# Patient Record
Sex: MALE | Race: WHITE | NOT HISPANIC OR LATINO | URBAN - METROPOLITAN AREA
[De-identification: names, ages, dates, MRNs, and addresses within clinical notes are randomized per-mention and may not be internally consistent; named-entity substitution may affect disease eponyms.]

---

## 2017-02-06 ENCOUNTER — INPATIENT (INPATIENT)
Facility: HOSPITAL | Age: 82
LOS: 3 days | Discharge: HOME | End: 2017-02-10
Attending: HOSPITALIST | Admitting: INTERNAL MEDICINE
Payer: MEDICARE

## 2017-02-06 VITALS — TEMPERATURE: 98 F | DIASTOLIC BLOOD PRESSURE: 87 MMHG | HEART RATE: 66 BPM | SYSTOLIC BLOOD PRESSURE: 135 MMHG

## 2017-02-06 LAB
ALBUMIN SERPL ELPH-MCNC: 3.4 G/DL — SIGNIFICANT CHANGE UP (ref 3.3–5)
ALP SERPL-CCNC: 85 U/L — SIGNIFICANT CHANGE UP (ref 40–120)
ALT FLD-CCNC: 18 U/L — SIGNIFICANT CHANGE UP (ref 12–78)
ANION GAP SERPL CALC-SCNC: 12 MMOL/L — SIGNIFICANT CHANGE UP (ref 5–17)
ANISOCYTOSIS BLD QL: SIGNIFICANT CHANGE UP
APPEARANCE UR: CLEAR — SIGNIFICANT CHANGE UP
APTT BLD: 35.6 SEC — SIGNIFICANT CHANGE UP (ref 27.5–37.4)
AST SERPL-CCNC: 16 U/L — SIGNIFICANT CHANGE UP (ref 15–37)
BASOPHILS # BLD AUTO: 0 K/UL — SIGNIFICANT CHANGE UP (ref 0–0.2)
BILIRUB SERPL-MCNC: 0.7 MG/DL — SIGNIFICANT CHANGE UP (ref 0.2–1.2)
BILIRUB UR-MCNC: NEGATIVE — SIGNIFICANT CHANGE UP
BUN SERPL-MCNC: 11 MG/DL — SIGNIFICANT CHANGE UP (ref 7–23)
CALCIUM SERPL-MCNC: 8.6 MG/DL — SIGNIFICANT CHANGE UP (ref 8.5–10.1)
CHLORIDE SERPL-SCNC: 108 MMOL/L — SIGNIFICANT CHANGE UP (ref 96–108)
CO2 SERPL-SCNC: 24 MMOL/L — SIGNIFICANT CHANGE UP (ref 22–31)
COLOR SPEC: YELLOW — SIGNIFICANT CHANGE UP
CREAT SERPL-MCNC: 0.9 MG/DL — SIGNIFICANT CHANGE UP (ref 0.5–1.3)
DACRYOCYTES BLD QL SMEAR: SLIGHT — SIGNIFICANT CHANGE UP
DIFF PNL FLD: (no result)
EOSINOPHIL # BLD AUTO: 0.1 K/UL — SIGNIFICANT CHANGE UP (ref 0–0.5)
EOSINOPHIL NFR BLD AUTO: 1 % — SIGNIFICANT CHANGE UP (ref 0–6)
GLUCOSE SERPL-MCNC: 99 MG/DL — SIGNIFICANT CHANGE UP (ref 70–99)
GLUCOSE UR QL: NEGATIVE MG/DL — SIGNIFICANT CHANGE UP
HCT VFR BLD CALC: 35.6 % — LOW (ref 39–50)
HGB BLD-MCNC: 11.4 G/DL — LOW (ref 13–17)
HYPOCHROMIA BLD QL: SLIGHT — SIGNIFICANT CHANGE UP
KETONES UR-MCNC: NEGATIVE — SIGNIFICANT CHANGE UP
LACTATE SERPL-SCNC: 1.3 MMOL/L — SIGNIFICANT CHANGE UP (ref 0.7–2)
LEUKOCYTE ESTERASE UR-ACNC: (no result)
LYMPHOCYTES # BLD AUTO: 1.8 K/UL — SIGNIFICANT CHANGE UP (ref 1–3.3)
LYMPHOCYTES # BLD AUTO: 9 % — LOW (ref 13–44)
MACROCYTES BLD QL: SIGNIFICANT CHANGE UP
MANUAL DIF COMMENT BLD-IMP: SIGNIFICANT CHANGE UP
MCHC RBC-ENTMCNC: 32.1 GM/DL — SIGNIFICANT CHANGE UP (ref 32–36)
MCHC RBC-ENTMCNC: 34.3 PG — HIGH (ref 27–34)
MCV RBC AUTO: 106.7 FL — HIGH (ref 80–100)
MONOCYTES # BLD AUTO: 0.8 K/UL — SIGNIFICANT CHANGE UP (ref 0–0.9)
MONOCYTES NFR BLD AUTO: 10 % — SIGNIFICANT CHANGE UP (ref 2–14)
NEUTROPHILS # BLD AUTO: 3.8 K/UL — SIGNIFICANT CHANGE UP (ref 1.8–7.4)
NEUTROPHILS NFR BLD AUTO: 65 % — SIGNIFICANT CHANGE UP (ref 43–77)
NITRITE UR-MCNC: NEGATIVE — SIGNIFICANT CHANGE UP
OVALOCYTES BLD QL SMEAR: SLIGHT — SIGNIFICANT CHANGE UP
PH UR: 6 — SIGNIFICANT CHANGE UP (ref 4.8–8)
PLAT MORPH BLD: NORMAL — SIGNIFICANT CHANGE UP
PLATELET # BLD AUTO: 224 K/UL — SIGNIFICANT CHANGE UP (ref 150–400)
POLYCHROMASIA BLD QL SMEAR: SLIGHT — SIGNIFICANT CHANGE UP
POTASSIUM SERPL-MCNC: 3.9 MMOL/L — SIGNIFICANT CHANGE UP (ref 3.5–5.3)
POTASSIUM SERPL-SCNC: 3.9 MMOL/L — SIGNIFICANT CHANGE UP (ref 3.5–5.3)
PROT SERPL-MCNC: 6.7 GM/DL — SIGNIFICANT CHANGE UP (ref 6–8.3)
PROT UR-MCNC: 15 MG/DL
RBC # BLD: 3.33 M/UL — LOW (ref 4.2–5.8)
RBC # FLD: 15.8 % — HIGH (ref 10.3–14.5)
RBC BLD AUTO: (no result)
RBC CASTS # UR COMP ASSIST: SIGNIFICANT CHANGE UP /HPF (ref 0–4)
SODIUM SERPL-SCNC: 144 MMOL/L — SIGNIFICANT CHANGE UP (ref 135–145)
SP GR SPEC: 1.01 — SIGNIFICANT CHANGE UP (ref 1.01–1.02)
UROBILINOGEN FLD QL: NEGATIVE MG/DL — SIGNIFICANT CHANGE UP
VARIANT LYMPHS # BLD: 15 % — HIGH (ref 0–6)
WBC # BLD: 6.5 K/UL — SIGNIFICANT CHANGE UP (ref 3.8–10.5)
WBC # FLD AUTO: 6.5 K/UL — SIGNIFICANT CHANGE UP (ref 3.8–10.5)
WBC UR QL: SIGNIFICANT CHANGE UP

## 2017-02-06 PROCEDURE — 99285 EMERGENCY DEPT VISIT HI MDM: CPT

## 2017-02-06 PROCEDURE — 93010 ELECTROCARDIOGRAM REPORT: CPT

## 2017-02-06 PROCEDURE — 71010: CPT | Mod: 26

## 2017-02-06 RX ORDER — CEFAZOLIN SODIUM 1 G
2000 VIAL (EA) INJECTION ONCE
Qty: 0 | Refills: 0 | Status: COMPLETED | OUTPATIENT
Start: 2017-02-06 | End: 2017-02-06

## 2017-02-06 RX ORDER — DILTIAZEM HCL 120 MG
120 CAPSULE, EXT RELEASE 24 HR ORAL DAILY
Qty: 0 | Refills: 0 | Status: DISCONTINUED | OUTPATIENT
Start: 2017-02-06 | End: 2017-02-10

## 2017-02-06 RX ORDER — CEFTRIAXONE 500 MG/1
1 INJECTION, POWDER, FOR SOLUTION INTRAMUSCULAR; INTRAVENOUS EVERY 24 HOURS
Qty: 0 | Refills: 0 | Status: DISCONTINUED | OUTPATIENT
Start: 2017-02-07 | End: 2017-02-07

## 2017-02-06 RX ORDER — DRONEDARONE 400 MG/1
1 TABLET, FILM COATED ORAL
Qty: 0 | Refills: 0 | COMMUNITY

## 2017-02-06 RX ORDER — TAMSULOSIN HYDROCHLORIDE 0.4 MG/1
0.4 CAPSULE ORAL DAILY
Qty: 0 | Refills: 0 | Status: DISCONTINUED | OUTPATIENT
Start: 2017-02-06 | End: 2017-02-10

## 2017-02-06 RX ORDER — SODIUM CHLORIDE 9 MG/ML
3 INJECTION INTRAMUSCULAR; INTRAVENOUS; SUBCUTANEOUS ONCE
Qty: 0 | Refills: 0 | Status: COMPLETED | OUTPATIENT
Start: 2017-02-06 | End: 2017-02-06

## 2017-02-06 RX ORDER — FINASTERIDE 5 MG/1
5 TABLET, FILM COATED ORAL DAILY
Qty: 0 | Refills: 0 | Status: DISCONTINUED | OUTPATIENT
Start: 2017-02-06 | End: 2017-02-10

## 2017-02-06 RX ORDER — ATORVASTATIN CALCIUM 80 MG/1
20 TABLET, FILM COATED ORAL AT BEDTIME
Qty: 0 | Refills: 0 | Status: DISCONTINUED | OUTPATIENT
Start: 2017-02-06 | End: 2017-02-10

## 2017-02-06 RX ORDER — SODIUM CHLORIDE 9 MG/ML
500 INJECTION INTRAMUSCULAR; INTRAVENOUS; SUBCUTANEOUS
Qty: 0 | Refills: 0 | Status: DISCONTINUED | OUTPATIENT
Start: 2017-02-06 | End: 2017-02-06

## 2017-02-06 RX ORDER — CLOPIDOGREL BISULFATE 75 MG/1
75 TABLET, FILM COATED ORAL DAILY
Qty: 0 | Refills: 0 | Status: DISCONTINUED | OUTPATIENT
Start: 2017-02-06 | End: 2017-02-10

## 2017-02-06 RX ORDER — SODIUM CHLORIDE 9 MG/ML
750 INJECTION INTRAMUSCULAR; INTRAVENOUS; SUBCUTANEOUS ONCE
Qty: 0 | Refills: 0 | Status: COMPLETED | OUTPATIENT
Start: 2017-02-06 | End: 2017-02-06

## 2017-02-06 RX ORDER — ACETAMINOPHEN 500 MG
650 TABLET ORAL EVERY 6 HOURS
Qty: 0 | Refills: 0 | Status: DISCONTINUED | OUTPATIENT
Start: 2017-02-06 | End: 2017-02-10

## 2017-02-06 RX ORDER — ASPIRIN/CALCIUM CARB/MAGNESIUM 324 MG
81 TABLET ORAL DAILY
Qty: 0 | Refills: 0 | Status: DISCONTINUED | OUTPATIENT
Start: 2017-02-06 | End: 2017-02-10

## 2017-02-06 RX ORDER — VANCOMYCIN HCL 1 G
1000 VIAL (EA) INTRAVENOUS ONCE
Qty: 0 | Refills: 0 | Status: COMPLETED | OUTPATIENT
Start: 2017-02-06 | End: 2017-02-06

## 2017-02-06 RX ORDER — DRONEDARONE 400 MG/1
400 TABLET, FILM COATED ORAL DAILY
Qty: 0 | Refills: 0 | Status: DISCONTINUED | OUTPATIENT
Start: 2017-02-06 | End: 2017-02-10

## 2017-02-06 RX ADMIN — SODIUM CHLORIDE 3 MILLILITER(S): 9 INJECTION INTRAMUSCULAR; INTRAVENOUS; SUBCUTANEOUS at 17:22

## 2017-02-06 RX ADMIN — Medication 250 MILLIGRAM(S): at 17:51

## 2017-02-06 RX ADMIN — ATORVASTATIN CALCIUM 20 MILLIGRAM(S): 80 TABLET, FILM COATED ORAL at 23:00

## 2017-02-06 RX ADMIN — SODIUM CHLORIDE 681.82 MILLILITER(S): 9 INJECTION INTRAMUSCULAR; INTRAVENOUS; SUBCUTANEOUS at 17:22

## 2017-02-06 RX ADMIN — Medication 100 MILLIGRAM(S): at 17:22

## 2017-02-06 NOTE — H&P ADULT - NSHPPHYSICALEXAM_GEN_ALL_CORE
T 98.7 /72 HR 70 RR 16 SaO2 95%    GEN: appears comfortable  Neuro: AAOx3, nonfocal  HEENT: NC/AT, EOMI  Neck: no thyroidmegaly, no JVD  Cardiovascular: S1S2 present, regular rhythm, +systolic murmur  Respiratory: breath sounds normal bilaterally, no wheezing, no rales, no rhonchi  Gastrointestinal: bowel sounds normal, soft, no abdominal tenderness  Musculoskeletal: no muscle tenderness  Extremities: No edema on R leg. L leg - erythema, swelling, no tenderness  Skin: dry skin of RLE

## 2017-02-06 NOTE — ED STATDOCS - PROGRESS NOTE DETAILS
89 yo male with a PMH of aortic valve replacement and leaky aortic valve on plavix and asa presents with left foot infection. Pt noticed it 8 days ago without trauma or opening to the skin. Was placed on clind appro 7 days without relief. Was told by pmd to go to the ER. IV abx, labs, admit.

## 2017-02-06 NOTE — ED STATDOCS - DETAILS:
I, Singh Kirk, performed the initial face to face bedside interview with this patient regarding history of present illness and determined that the patient should be seen in the main ED.  The history, was documented by the scribe in my presence and I attest to the accuracy of the documentation. I personally saw the patient with the PA, and completed the key components of the history and physical exam. I then discussed the management plan with the PA.

## 2017-02-06 NOTE — H&P ADULT - NSHPLABSRESULTS_GEN_ALL_CORE
11.4   6.5   )-----------( 224      ( 06 Feb 2017 17:12 )             35.6                06 Feb 2017 17:12    144    |  108    |  11     ----------------------------<  99     3.9     |  24     |  0.90     Ca    8.6        06 Feb 2017 17:12    TPro  6.7    /  Alb  3.4    /  TBili  0.7    /  DBili  x      /  AST  16     /  ALT  18     /  AlkPhos  85     06 Feb 2017 17:12

## 2017-02-06 NOTE — ED ADULT NURSE REASSESSMENT NOTE - NS ED NURSE REASSESS COMMENT FT1
Pt is a 89 y/o A&O x 4 male presents to ED sent in by MD for left foot infection. Pt denies fever or chills. SL initiated, labs drawn. Urine specimen obtained and sent to lab. IV abx started per orders. Care of patient transferred to Mavis WILDE at this time.

## 2017-02-06 NOTE — H&P ADULT - HISTORY OF PRESENT ILLNESS
89 y.o. male with PMH CAD s/p stents, HTN, HLD, aortic stenosis s/p AVR, BPH presents with 10 day hx of L foot redness and swelling. Pt denies any trauma to area, denies pain in foot. States received outpt antibiotics that didn't help and was told to come to ED for IV Abx. He denies any fever, chills, CP. +chronic SOB after AVR surgery. States he had ultrasound BLE 8 days ago, which pt reports negative.    PSH: Lumbar spine surgery, L hip replacement 2006, s/p AVR, stents, colon repair  Social Hx: former smoker, social drinker, retired   Fam Hx: Noncontrib to current presentation

## 2017-02-06 NOTE — ED STATDOCS - OBJECTIVE STATEMENT
89 y/o M PMHx Aortic valve "leak" presents to the ED c/o left leg infection. The pt provides the left leg was red since one week. No h/o cp, cough, abd pain, nvd, headache, fever, chills, dizziness or urinary incontinence.

## 2017-02-06 NOTE — ED STATDOCS - NS ED MD SCRIBE ATTENDING SCRIBE SECTIONS
HISTORY OF PRESENT ILLNESS/REVIEW OF SYSTEMS/INTAKE ASSESSMENT/SCREENINGS/PAST MEDICAL/SURGICAL/SOCIAL HISTORY/HIV/DISPOSITION/PHYSICAL EXAM

## 2017-02-07 DIAGNOSIS — I48.0 PAROXYSMAL ATRIAL FIBRILLATION: ICD-10-CM

## 2017-02-07 DIAGNOSIS — I10 ESSENTIAL (PRIMARY) HYPERTENSION: ICD-10-CM

## 2017-02-07 LAB
ANION GAP SERPL CALC-SCNC: 10 MMOL/L — SIGNIFICANT CHANGE UP (ref 5–17)
BASOPHILS # BLD AUTO: 0.1 K/UL — SIGNIFICANT CHANGE UP (ref 0–0.2)
BASOPHILS NFR BLD AUTO: 1 % — SIGNIFICANT CHANGE UP (ref 0–2)
BUN SERPL-MCNC: 9 MG/DL — SIGNIFICANT CHANGE UP (ref 7–23)
CALCIUM SERPL-MCNC: 8.2 MG/DL — LOW (ref 8.5–10.1)
CHLORIDE SERPL-SCNC: 108 MMOL/L — SIGNIFICANT CHANGE UP (ref 96–108)
CHOLEST SERPL-MCNC: 96 MG/DL — SIGNIFICANT CHANGE UP (ref 10–199)
CO2 SERPL-SCNC: 26 MMOL/L — SIGNIFICANT CHANGE UP (ref 22–31)
CREAT SERPL-MCNC: 0.76 MG/DL — SIGNIFICANT CHANGE UP (ref 0.5–1.3)
EOSINOPHIL # BLD AUTO: 0.1 K/UL — SIGNIFICANT CHANGE UP (ref 0–0.5)
EOSINOPHIL NFR BLD AUTO: 1.7 % — SIGNIFICANT CHANGE UP (ref 0–6)
FOLATE SERPL-MCNC: >20 NG/ML — SIGNIFICANT CHANGE UP (ref 4.8–24.2)
GLUCOSE SERPL-MCNC: 102 MG/DL — HIGH (ref 70–99)
HCT VFR BLD CALC: 32.8 % — LOW (ref 39–50)
HDLC SERPL-MCNC: 52 MG/DL — SIGNIFICANT CHANGE UP (ref 40–125)
HGB BLD-MCNC: 10.6 G/DL — LOW (ref 13–17)
LIPID PNL WITH DIRECT LDL SERPL: 29 MG/DL — SIGNIFICANT CHANGE UP
LYMPHOCYTES # BLD AUTO: 1.3 K/UL — SIGNIFICANT CHANGE UP (ref 1–3.3)
LYMPHOCYTES # BLD AUTO: 24.5 % — SIGNIFICANT CHANGE UP (ref 13–44)
MCHC RBC-ENTMCNC: 32.3 GM/DL — SIGNIFICANT CHANGE UP (ref 32–36)
MCHC RBC-ENTMCNC: 34.6 PG — HIGH (ref 27–34)
MCV RBC AUTO: 106.9 FL — HIGH (ref 80–100)
MONOCYTES # BLD AUTO: 0.8 K/UL — SIGNIFICANT CHANGE UP (ref 0–0.9)
MONOCYTES NFR BLD AUTO: 14.7 % — HIGH (ref 2–14)
NEUTROPHILS # BLD AUTO: 3.2 K/UL — SIGNIFICANT CHANGE UP (ref 1.8–7.4)
NEUTROPHILS NFR BLD AUTO: 58.2 % — SIGNIFICANT CHANGE UP (ref 43–77)
PLATELET # BLD AUTO: 183 K/UL — SIGNIFICANT CHANGE UP (ref 150–400)
POTASSIUM SERPL-MCNC: 3.8 MMOL/L — SIGNIFICANT CHANGE UP (ref 3.5–5.3)
POTASSIUM SERPL-SCNC: 3.8 MMOL/L — SIGNIFICANT CHANGE UP (ref 3.5–5.3)
RBC # BLD: 3.07 M/UL — LOW (ref 4.2–5.8)
RBC # FLD: 15.5 % — HIGH (ref 10.3–14.5)
SODIUM SERPL-SCNC: 144 MMOL/L — SIGNIFICANT CHANGE UP (ref 135–145)
TOTAL CHOLESTEROL/HDL RATIO MEASUREMENT: 1.8 RATIO — LOW (ref 3.4–9.6)
TRIGL SERPL-MCNC: 76 MG/DL — SIGNIFICANT CHANGE UP (ref 10–149)
VIT B12 SERPL-MCNC: 246 PG/ML — SIGNIFICANT CHANGE UP (ref 243–894)
WBC # BLD: 5.5 K/UL — SIGNIFICANT CHANGE UP (ref 3.8–10.5)
WBC # FLD AUTO: 5.5 K/UL — SIGNIFICANT CHANGE UP (ref 3.8–10.5)

## 2017-02-07 PROCEDURE — 99222 1ST HOSP IP/OBS MODERATE 55: CPT

## 2017-02-07 RX ORDER — VANCOMYCIN HCL 1 G
VIAL (EA) INTRAVENOUS
Qty: 0 | Refills: 0 | Status: DISCONTINUED | OUTPATIENT
Start: 2017-02-07 | End: 2017-02-10

## 2017-02-07 RX ORDER — VANCOMYCIN HCL 1 G
500 VIAL (EA) INTRAVENOUS ONCE
Qty: 0 | Refills: 0 | Status: DISCONTINUED | OUTPATIENT
Start: 2017-02-07 | End: 2017-02-07

## 2017-02-07 RX ORDER — VANCOMYCIN HCL 1 G
VIAL (EA) INTRAVENOUS
Qty: 0 | Refills: 0 | Status: DISCONTINUED | OUTPATIENT
Start: 2017-02-07 | End: 2017-02-07

## 2017-02-07 RX ORDER — CEFTRIAXONE 500 MG/1
1 INJECTION, POWDER, FOR SOLUTION INTRAMUSCULAR; INTRAVENOUS DAILY
Qty: 0 | Refills: 0 | Status: DISCONTINUED | OUTPATIENT
Start: 2017-02-07 | End: 2017-02-10

## 2017-02-07 RX ORDER — SOD,AMMONIUM,POTASSIUM LACTATE
1 CREAM (GRAM) TOPICAL
Qty: 0 | Refills: 0 | Status: DISCONTINUED | OUTPATIENT
Start: 2017-02-07 | End: 2017-02-10

## 2017-02-07 RX ORDER — VANCOMYCIN HCL 1 G
500 VIAL (EA) INTRAVENOUS EVERY 12 HOURS
Qty: 0 | Refills: 0 | Status: DISCONTINUED | OUTPATIENT
Start: 2017-02-07 | End: 2017-02-10

## 2017-02-07 RX ORDER — VANCOMYCIN HCL 1 G
500 VIAL (EA) INTRAVENOUS ONCE
Qty: 0 | Refills: 0 | Status: COMPLETED | OUTPATIENT
Start: 2017-02-07 | End: 2017-02-07

## 2017-02-07 RX ADMIN — Medication 1 APPLICATION(S): at 16:35

## 2017-02-07 RX ADMIN — FINASTERIDE 5 MILLIGRAM(S): 5 TABLET, FILM COATED ORAL at 11:23

## 2017-02-07 RX ADMIN — Medication 81 MILLIGRAM(S): at 11:23

## 2017-02-07 RX ADMIN — Medication 5 MILLIGRAM(S): at 16:35

## 2017-02-07 RX ADMIN — TAMSULOSIN HYDROCHLORIDE 0.4 MILLIGRAM(S): 0.4 CAPSULE ORAL at 11:23

## 2017-02-07 RX ADMIN — CEFTRIAXONE 100 GRAM(S): 500 INJECTION, POWDER, FOR SOLUTION INTRAMUSCULAR; INTRAVENOUS at 00:19

## 2017-02-07 RX ADMIN — CEFTRIAXONE 100 GRAM(S): 500 INJECTION, POWDER, FOR SOLUTION INTRAMUSCULAR; INTRAVENOUS at 16:34

## 2017-02-07 RX ADMIN — Medication 120 MILLIGRAM(S): at 05:39

## 2017-02-07 RX ADMIN — CLOPIDOGREL BISULFATE 75 MILLIGRAM(S): 75 TABLET, FILM COATED ORAL at 11:23

## 2017-02-07 RX ADMIN — ATORVASTATIN CALCIUM 20 MILLIGRAM(S): 80 TABLET, FILM COATED ORAL at 22:15

## 2017-02-07 RX ADMIN — Medication 5 MILLIGRAM(S): at 05:39

## 2017-02-07 RX ADMIN — Medication 100 MILLIGRAM(S): at 12:25

## 2017-02-07 RX ADMIN — Medication 100 MILLIGRAM(S): at 23:32

## 2017-02-07 NOTE — CONSULT NOTE ADULT - PROBLEM SELECTOR RECOMMENDATION 9
Currently in sinus rhythm; patient has been tolerating Multaq for the maintenance of sinus rhythm for many years -- I called his outpatient physician practice and verified dosing schedule - will continue his stable, outpatient regimen.

## 2017-02-07 NOTE — PROGRESS NOTE ADULT - ASSESSMENT
89 y.o. male with PMH CAD s/p stents, HTN, HLD, aortic stenosis s/p AVR, BPH presents with 10 day hx of L foot redness and swelling.    #L foot cellulitis not responding to outpt cephalexin  -cont vanco, add ceftriaxone  -f/u cultures  -ID consult appreciated    #CAD s/p stents, AS s/p AVR  -on asa, plavix, cardizem, multaq, enalapril, lipitor  -lipid panel    #BPH  -on finasteride, flomax    #macrocytic anemia  -check B12/folate    #HTN, HLD  -cont meds      Conitnue IV ABT. Case discussed with Dr braun.

## 2017-02-07 NOTE — CONSULT NOTE ADULT - ASSESSMENT
89 y.o. male with PMH CAD s/p stents, HTN, HLD, aortic stenosis s/p AVR, BPH admitted on 2/6 with 10 day hx of L foot redness and swelling. Pt denies any trauma to area, denies pain in foot. States received outpt antibiotics that didn't help and was told to come to ED for IV Abx. He denies any fever, chills, CP. +chronic SOB after AVR surgery. Notes that he does not go to podiatry, has seen some improvement since admission and is requesting more details regarding the exact times of his iv antibiotics  1. Left foot cellulitis  -source of cellulitis most likely secondary to toenail onchymychosis; suggest outpatient podiatry evaluation  - follow up cultures   -will continue ceftriaxone but adjust time of dose  - will add vancomycin to treat resistant bacteria   - check vancomycin trough prior to fourth dose   - elevate legs   -lachydrin to dry skin  2. other issues:  CAD s/p stents, HTN, HLD, aortic stenosis s/p AVR, BPH  - per medicine

## 2017-02-07 NOTE — CONSULT NOTE ADULT - SUBJECTIVE AND OBJECTIVE BOX
Patient is a 89y old  Male who presents with a chief complaint of LLE cellulitis (2017 19:49)    HPI:  89 y.o. male with PMH CAD s/p stents, HTN, HLD, aortic stenosis s/p AVR, BPH admitted on  with 10 day hx of L foot redness and swelling. Pt denies any trauma to area, denies pain in foot. States received outpt antibiotics that didn't help and was told to come to ED for IV Abx. He denies any fever, chills, CP. +chronic SOB after AVR surgery. Notes that he does not go to podiatry, has seen some improvement since admission and is requesting more details regarding the exact times of his iv antibiotics    PSH: Lumbar spine surgery, L hip replacement , s/p AVR, stents, colon repair  Social Hx: former smoker, social drinker, retired   Fam Hx: Noncontrib to current presentation (2017 19:49)      PMH: as above  PSH: as above  Meds: per reconcilation sheet, noted below  MEDICATIONS  (STANDING):  clopidogrel Tablet 75milliGRAM(s) Oral daily  diltiazem   CD 120milliGRAM(s) Oral daily  aspirin  chewable 81milliGRAM(s) Oral daily  dronedarone 400milliGRAM(s) Oral daily  finasteride 5milliGRAM(s) Oral daily  tamsulosin 0.4milliGRAM(s) Oral daily  enalapril 5milliGRAM(s) Oral two times a day  atorvastatin 20milliGRAM(s) Oral at bedtime  cefTRIAXone   IVPB 1Gram(s) IV Intermittent daily  vancomycin  IVPB  IV Intermittent     MEDICATIONS  (PRN):  acetaminophen   Tablet 650milliGRAM(s) Oral every 6 hours PRN For Temp greater than 38 C (100.4 F)    Allergies    No Known Allergies    Intolerances        FAMILY HISTORY:    ROS: the patient denies fever, no chills, no HA, no dizziness, no sore throat, no blurry vision, no CP, no palpitations, no SOB, no cough, no abdominal pain, no diarrhea, no N/V, no dysuria, no leg pain, no claudication, no rash, no joint aches, no rectal pain or bleeding, no night sweats  Vital Signs Last 24 Hrs  T(C): 37, Max: 37 ( @ 08:44)  T(F): 98.6, Max: 98.6 ( @ 08:44)  HR: 63 (63 - 73)  BP: 158/62 (131/63 - 158/62)  BP(mean): --  RR: 18 (18 - 18)  SpO2: 94% (94% - 94%)  Daily     Daily   Constitutional: well developed well nourished  HEENT: NC/AT, EOMI, PERRLA  Neck: supple  Back: no tenderness  Respiratory: clear  Cardiovascular: S1S2 regular, no murmurs  Abdomen: soft, not tender, not distended, positive BS  Genitourinary: deferred  Rectal: deferred  Musculoskeletal: left foot with erythema across dorsum of foot; onchymycosis of toenails  Extremities: mild  pedal edema  Neurological: AxOx3, moving all extremities, no focal deficits  Skin: no rashes                          10.6   5.5   )-----------( 183      ( 2017 07:25 )             32.8     2017 07:25    144    |  108    |  9      ----------------------------<  102    3.8     |  26     |  0.76     Ca    8.2        2017 07:25    TPro  6.7    /  Alb  3.4    /  TBili  0.7    /  DBili  x      /  AST  16     /  ALT  18     /  AlkPhos  85     2017 17:12     LIVER FUNCTIONS - ( 2017 17:12 )  Alb: 3.4 g/dL / Pro: 6.7 gm/dL / ALK PHOS: 85 U/L / ALT: 18 U/L / AST: 16 U/L / GGT: x           Urinalysis Basic - ( 2017 17:12 )    Color: Yellow / Appearance: Clear / S.015 / pH: x  Gluc: x / Ketone: Negative  / Bili: Negative / Urobili: Negative mg/dL   Blood: x / Protein: 15 mg/dL / Nitrite: Negative   Leuk Esterase: Trace / RBC: 0-2 /HPF / WBC 0-2   Sq Epi: x / Non Sq Epi: x / Bacteria: x          Radiology:Impression: CXR     Likely minimal pulmonary vascular congestion with questionable   superimposed right lower lobe pneumonia

## 2017-02-08 LAB
ANION GAP SERPL CALC-SCNC: 9 MMOL/L — SIGNIFICANT CHANGE UP (ref 5–17)
BUN SERPL-MCNC: 11 MG/DL — SIGNIFICANT CHANGE UP (ref 7–23)
CALCIUM SERPL-MCNC: 8.3 MG/DL — LOW (ref 8.5–10.1)
CHLORIDE SERPL-SCNC: 108 MMOL/L — SIGNIFICANT CHANGE UP (ref 96–108)
CO2 SERPL-SCNC: 25 MMOL/L — SIGNIFICANT CHANGE UP (ref 22–31)
CREAT SERPL-MCNC: 0.75 MG/DL — SIGNIFICANT CHANGE UP (ref 0.5–1.3)
FOLATE SERPL-MCNC: >20 NG/ML — SIGNIFICANT CHANGE UP (ref 4.8–24.2)
GLUCOSE SERPL-MCNC: 112 MG/DL — HIGH (ref 70–99)
HCT VFR BLD CALC: 33.4 % — LOW (ref 39–50)
HGB BLD-MCNC: 10.8 G/DL — LOW (ref 13–17)
MAGNESIUM SERPL-MCNC: 2 MG/DL — SIGNIFICANT CHANGE UP (ref 1.8–2.4)
MCHC RBC-ENTMCNC: 32.3 GM/DL — SIGNIFICANT CHANGE UP (ref 32–36)
MCHC RBC-ENTMCNC: 34.3 PG — HIGH (ref 27–34)
MCV RBC AUTO: 106.2 FL — HIGH (ref 80–100)
PLATELET # BLD AUTO: 195 K/UL — SIGNIFICANT CHANGE UP (ref 150–400)
POTASSIUM SERPL-MCNC: 3.6 MMOL/L — SIGNIFICANT CHANGE UP (ref 3.5–5.3)
POTASSIUM SERPL-SCNC: 3.6 MMOL/L — SIGNIFICANT CHANGE UP (ref 3.5–5.3)
RBC # BLD: 3.14 M/UL — LOW (ref 4.2–5.8)
RBC # FLD: 15.5 % — HIGH (ref 10.3–14.5)
SODIUM SERPL-SCNC: 142 MMOL/L — SIGNIFICANT CHANGE UP (ref 135–145)
VIT B12 SERPL-MCNC: 261 PG/ML — SIGNIFICANT CHANGE UP (ref 243–894)
WBC # BLD: 5.9 K/UL — SIGNIFICANT CHANGE UP (ref 3.8–10.5)
WBC # FLD AUTO: 5.9 K/UL — SIGNIFICANT CHANGE UP (ref 3.8–10.5)

## 2017-02-08 RX ADMIN — DRONEDARONE 400 MILLIGRAM(S): 400 TABLET, FILM COATED ORAL at 05:24

## 2017-02-08 RX ADMIN — Medication 100 MILLIGRAM(S): at 05:23

## 2017-02-08 RX ADMIN — Medication 1 APPLICATION(S): at 18:14

## 2017-02-08 RX ADMIN — CLOPIDOGREL BISULFATE 75 MILLIGRAM(S): 75 TABLET, FILM COATED ORAL at 11:46

## 2017-02-08 RX ADMIN — Medication 5 MILLIGRAM(S): at 18:13

## 2017-02-08 RX ADMIN — Medication 81 MILLIGRAM(S): at 11:46

## 2017-02-08 RX ADMIN — FINASTERIDE 5 MILLIGRAM(S): 5 TABLET, FILM COATED ORAL at 11:46

## 2017-02-08 RX ADMIN — ATORVASTATIN CALCIUM 20 MILLIGRAM(S): 80 TABLET, FILM COATED ORAL at 21:06

## 2017-02-08 RX ADMIN — Medication 120 MILLIGRAM(S): at 05:24

## 2017-02-08 RX ADMIN — CEFTRIAXONE 100 GRAM(S): 500 INJECTION, POWDER, FOR SOLUTION INTRAMUSCULAR; INTRAVENOUS at 11:46

## 2017-02-08 RX ADMIN — Medication 100 MILLIGRAM(S): at 18:12

## 2017-02-08 RX ADMIN — Medication 1 APPLICATION(S): at 05:23

## 2017-02-08 RX ADMIN — Medication 5 MILLIGRAM(S): at 05:24

## 2017-02-08 RX ADMIN — TAMSULOSIN HYDROCHLORIDE 0.4 MILLIGRAM(S): 0.4 CAPSULE ORAL at 12:44

## 2017-02-08 NOTE — PROGRESS NOTE ADULT - ASSESSMENT
89 y.o. male with PMH CAD s/p stents, HTN, HLD, aortic stenosis s/p AVR, BPH admitted on 2/6 with 10 day hx of L foot redness and swelling. Pt denies any trauma to area, denies pain in foot. States received outpt antibiotics that didn't help and was told to come to ED for IV Abx. He denies any fever, chills, CP. +chronic SOB after AVR surgery. Notes that he does not go to podiatry, has seen some improvement since admission and is requesting more details regarding the exact times of his iv antibiotics  1. Left foot cellulitis  -source of cellulitis most likely secondary to toenail onchymychosis; suggest outpatient podiatry evaluation  - follow up cultures   -day #2 ceftriaxone and vancomycin  - tolerating antibiotics without rashes or side effects   -will possibly change in am to po doxycycline 100 mg q 12 hours for 7 more days  - check vancomycin trough prior to fourth dose   - elevate legs   -lachydrin to dry skin  2. other issues:  CAD s/p stents, HTN, HLD, aortic stenosis s/p AVR, BPH  - per medicine

## 2017-02-08 NOTE — PROGRESS NOTE ADULT - ASSESSMENT
89 y.o. male with PMH CAD s/p stents, HTN, HLD, aortic stenosis s/p AVR, BPH presents with 10 day hx of L foot redness and swelling.    #L foot cellulitis not responding to outpt cephalexin  -cont vancomycin and ceftriaxone  -f/u cultures- NGT  -elevate legs  -Check Vancomycin trough before day #4 abx  -monitor vitals and CBC  -ID consult appreciated    #CAD s/p stents, AS s/p AVR  -on asa, plavix, cardizem, multaq, enalapril, lipitor  -lipid panel- nl    #BPH  -on finasteride, flomax    #macrocytic anemia  - B12/folate-nl    #HTN, HLD  -cont meds      Conitnue IV ABT. Case discussed with Dr braun. 89 y.o. male with PMH CAD s/p stents, HTN, HLD, aortic stenosis s/p AVR, BPH presents with 10 day hx of L foot redness and swelling.    #L foot cellulitis not responding to outpt cephalexin  -cont vancomycin and ceftriaxone day 2  -f/u cultures- NGT  -elevate legs  -Check Vancomycin trough before day #4 abx  -monitor vitals and CBC  -ID consult appreciated    #CAD s/p stents, AS s/p AVR  -on asa, plavix, cardizem, multaq, enalapril, lipitor  -lipid panel- nl    #BPH  -on finasteride, flomax    #macrocytic anemia  - B12/folate-nl    #HTN, HLD  -cont meds      Conitnue IV ABT. Case discussed with Dr fishman

## 2017-02-09 RX ADMIN — CLOPIDOGREL BISULFATE 75 MILLIGRAM(S): 75 TABLET, FILM COATED ORAL at 11:51

## 2017-02-09 RX ADMIN — Medication 1 APPLICATION(S): at 18:06

## 2017-02-09 RX ADMIN — Medication 100 MILLIGRAM(S): at 05:38

## 2017-02-09 RX ADMIN — ATORVASTATIN CALCIUM 20 MILLIGRAM(S): 80 TABLET, FILM COATED ORAL at 22:26

## 2017-02-09 RX ADMIN — TAMSULOSIN HYDROCHLORIDE 0.4 MILLIGRAM(S): 0.4 CAPSULE ORAL at 11:49

## 2017-02-09 RX ADMIN — CEFTRIAXONE 100 GRAM(S): 500 INJECTION, POWDER, FOR SOLUTION INTRAMUSCULAR; INTRAVENOUS at 11:51

## 2017-02-09 RX ADMIN — Medication 120 MILLIGRAM(S): at 05:39

## 2017-02-09 RX ADMIN — FINASTERIDE 5 MILLIGRAM(S): 5 TABLET, FILM COATED ORAL at 11:51

## 2017-02-09 RX ADMIN — DRONEDARONE 400 MILLIGRAM(S): 400 TABLET, FILM COATED ORAL at 05:39

## 2017-02-09 RX ADMIN — Medication 1 APPLICATION(S): at 05:39

## 2017-02-09 RX ADMIN — Medication 5 MILLIGRAM(S): at 18:06

## 2017-02-09 RX ADMIN — Medication 100 MILLIGRAM(S): at 18:06

## 2017-02-09 RX ADMIN — Medication 5 MILLIGRAM(S): at 06:09

## 2017-02-09 RX ADMIN — Medication 81 MILLIGRAM(S): at 11:50

## 2017-02-09 NOTE — PROGRESS NOTE ADULT - ASSESSMENT
89 y.o. male with PMH CAD s/p stents, HTN, HLD, aortic stenosis s/p AVR, BPH presents with 10 day hx of L foot redness and swelling.    #L foot cellulitis not responding to outpt cephalexin  -cont vancomycin and ceftriaxone day 3  -f/u cultures- NGT  -elevate legs  -Check Vancomycin trough before day #4 abx  -monitor vitals and CBC  -ID consult appreciated  - plan to switch to po abt in am    #CAD s/p stents, AS s/p AVR  -on asa, plavix, cardizem, multaq, enalapril, lipitor  -lipid panel- nl    #BPH  -on finasteride, flomax    #macrocytic anemia  - B12/folate-nl    #HTN, HLD  -cont meds      Conitnue IV ABT. Case discussed with Dr fishman. anticipate dc in am. 89 y.o. male with PMH CAD s/p stents, HTN, HLD, aortic stenosis s/p AVR, BPH presents with 10 day hx of L foot redness and swelling.    #L foot cellulitis not responding to outpt cephalexin  -cont vancomycin and ceftriaxone day 3 and given slow response will cahnge to po doxy tomorrow   -f/u cultures- NGT  -elevate legs  -Check Vancomycin trough before day #4 abx  -monitor vitals and CBC  -ID consult appreciated  - plan to switch to po abt in am    #CAD s/p stents, AS s/p AVR  -on asa, plavix, cardizem, multaq, enalapril, lipitor  -lipid panel- nl    #BPH  -on finasteride, flomax    #macrocytic anemia  - B12/folate-nl    #HTN, HLD  -cont meds      Conitnue IV ABT. Case discussed with Dr fishman. anticipate dc in am.

## 2017-02-09 NOTE — PROGRESS NOTE ADULT - ATTENDING COMMENTS
Patient seen and examined with NP Young.  Agree with physical exam and assessment and plan.  - change to po abx in AM

## 2017-02-09 NOTE — PROGRESS NOTE ADULT - SUBJECTIVE AND OBJECTIVE BOX
2/7- feels better this morning- no overnight events, as per patient redness is better after antibiotic was started.    2/8- LLE erythema improving .No new complaints.    REVIEW OF SYSTEMS:    CONSTITUTIONAL: No weakness, fevers or chills  EYES/ENT: No visual changes;  No vertigo or throat pain   NECK: No pain or stiffness  RESPIRATORY: No cough, wheezing, hemoptysis; No shortness of breath  CARDIOVASCULAR: No chest pain or palpitations  GASTROINTESTINAL: No abdominal or epigastric pain. No nausea, vomiting, or hematemesis; No diarrhea or constipation. No melena or hematochezia.  GENITOURINARY: No dysuria, frequency or hematuria  NEUROLOGICAL: No numbness or weakness  SKIN: No itching, burning, rashes, or lesions   All other review of systems is negative unless indicated above.    Vital Signs Last 24 Hrs  ICU Vital Signs Last 24 Hrs  T(C): 36.7, Max: 36.7 (02-07 @ 22:31)  T(F): 98.1, Max: 98.1 (02-07 @ 22:31)  HR: 60 (60 - 69)  BP: 139/58 (135/45 - 161/64)  BP(mean): --  ABP: --  ABP(mean): --  RR: 18 (18 - 19)  SpO2: 95% (94% - 95%)    MEDICATIONS  (STANDING):  clopidogrel Tablet 75milliGRAM(s) Oral daily  diltiazem   CD 120milliGRAM(s) Oral daily  aspirin  chewable 81milliGRAM(s) Oral daily  dronedarone 400milliGRAM(s) Oral daily  finasteride 5milliGRAM(s) Oral daily  tamsulosin 0.4milliGRAM(s) Oral daily  enalapril 5milliGRAM(s) Oral two times a day  atorvastatin 20milliGRAM(s) Oral at bedtime  cefTRIAXone   IVPB 1Gram(s) IV Intermittent daily  ammonium lactate 12% Lotion 1Application(s) Topical two times a day  vancomycin  IVPB  IV Intermittent   vancomycin  IVPB 500milliGRAM(s) IV Intermittent every 12 hours    MEDICATIONS  (PRN):  acetaminophen   Tablet 650milliGRAM(s) Oral every 6 hours PRN For Temp greater than 38 C (100.4 F)                                10.8   5.9   )-----------( 195      ( 08 Feb 2017 06:55 )             33.4       08 Feb 2017 06:55    142    |  108    |  11     ----------------------------<  112    3.6     |  25     |  0.75     Ca    8.3        08 Feb 2017 06:55  Mg     2.0       08 Feb 2017 06:55    Lipid Profile in AM (02.07.17 @ 07:25)    HDL/Total Cholesterol Ratio Measurement: 1.8 RATIO    Cholesterol, Serum: 96 mg/dL    Triglycerides, Serum: 76 mg/dL    HDL Cholesterol, Serum: 52 mg/dL    Direct LDL: 29: LDL Cholesterol    Folate, Serum in AM (02.07.17 @ 07:25)    Folate, Serum: >20.0 ng/mL    Vitamin B12, Serum in AM (02.07.17 @ 07:25)    Vitamin B12, Serum: 246 pg/mL    Culture - Blood (02.06.17 @ 17:12)    Specimen Source: .Blood None    Culture Results:   No growth to date.        TPro  6.7    /  Alb  3.4    /  TBili  0.7    /  DBili  x      /  AST  16     /  ALT  18     /  AlkPhos  85     06 Feb 2017 17:12      PHYSICAL EXAM:    General:  Alert, well-developed ,No acute distress.    Neuro:  Alert and oriented to person, place, and time. Able to communicate  well. Cranial nerves 2-12 grossly intact. 5/5 strength in all  extremities bilaterally. Sensation intact in all extremities.  Appropriate affect.     HEENT:  No JVD, no masses, Eyes anicteric, No carotid bruits. No lymphadenopathy,    Cardiovascular:  Regular rate and rhythm, with normal S1 and S2. No murmurs, rubs,  or gallops. No JVD.     Lungs:  clear. no rales, no wheezing, .    Abdomen:  Normoactive bowel sounds. Soft, flat, non-tender, and non-distended.  No hepatosplenomegaly, positive bowel sounds  no s/p tenderness    Skin:  Warm, dry, well-perfused. No rashes or other lesions.
2/9  feels better this morning. left leg less erythematous.    REVIEW OF SYSTEMS:    CONSTITUTIONAL: No weakness, fevers or chills  EYES/ENT: No visual changes;  No vertigo or throat pain   NECK: No pain or stiffness  RESPIRATORY: No cough, wheezing, hemoptysis; No shortness of breath  CARDIOVASCULAR: No chest pain or palpitations  GASTROINTESTINAL: No abdominal or epigastric pain. No nausea, vomiting, or hematemesis; No diarrhea or constipation. No melena or hematochezia.  GENITOURINARY: No dysuria, frequency or hematuria  NEUROLOGICAL: No numbness or weakness  SKIN: No itching, burning, rashes, or lesions   All other review of systems is negative unless indicated above.    Vital Signs Last 24 Hrs  T(C): 36.6, Max: 36.8 (02-08 @ 15:49)  T(F): 97.9, Max: 98.3 (02-08 @ 15:49)  HR: 79 (71 - 79)  BP: 137/69 (137/69 - 148/65)  BP(mean): --  RR: 17 (17 - 17)  SpO2: 97% (97% - 97%)    MEDICATIONS  (STANDING):  clopidogrel Tablet 75milliGRAM(s) Oral daily  diltiazem   CD 120milliGRAM(s) Oral daily  aspirin  chewable 81milliGRAM(s) Oral daily  dronedarone 400milliGRAM(s) Oral daily  finasteride 5milliGRAM(s) Oral daily  tamsulosin 0.4milliGRAM(s) Oral daily  enalapril 5milliGRAM(s) Oral two times a day  atorvastatin 20milliGRAM(s) Oral at bedtime  cefTRIAXone   IVPB 1Gram(s) IV Intermittent daily  ammonium lactate 12% Lotion 1Application(s) Topical two times a day  vancomycin  IVPB  IV Intermittent   vancomycin  IVPB 500milliGRAM(s) IV Intermittent every 12 hours    MEDICATIONS  (PRN):  acetaminophen   Tablet 650milliGRAM(s) Oral every 6 hours PRN For Temp greater than 38 C (100.4 F)                                10.8   5.9   )-----------( 195      ( 08 Feb 2017 06:55 )             33.4       08 Feb 2017 06:55    142    |  108    |  11     ----------------------------<  112    3.6     |  25     |  0.75     Ca    8.3        08 Feb 2017 06:55  Mg     2.0       08 Feb 2017 06:55        PHYSICAL EXAM:    General:Alert, well-developed ,No acute distress.  Neuro:Alert and oriented to person, place, and time. Able to communicate  well.  Sensation intact in all extremities.  Appropriate affect.   HEENT: No JVD, no masses, Eyes anicteric, No carotid bruits. No lymphadenopathy,  Cardiovascular: irregular , with normal S1 and S2. No murmurs, rubs,  or gallops. No JVD.   Lungs:clear. no rales, no wheezing, .  Abdomen: Normoactive bowel sounds. Soft, flat, non-tender, and non-distended.  No hepatosplenomegaly, positive bowel sounds  no s/p tenderness  Skin:Warm, dry, well-perfused. No rashes or other lesions. .
Patient is a 89y old  Male who presents with a chief complaint of LLE cellulitis (06 Feb 2017 19:49)    HPI:  89 y.o. male with PMH CAD s/p stents, HTN, HLD, aortic stenosis s/p AVR, BPH admitted on 2/6 with 10 day hx of L foot redness and swelling. Pt denies any trauma to area, denies pain in foot. States received outpt antibiotics that didn't help and was told to come to ED for IV Abx. He denies any fever, chills, CP. +chronic SOB after AVR surgery. Notes that he does not go to podiatry, has seen some improvement since admission and is requesting more details regarding the exact times of his iv antibiotics  MEDICATIONS  (STANDING):  clopidogrel Tablet 75milliGRAM(s) Oral daily  diltiazem   CD 120milliGRAM(s) Oral daily  aspirin  chewable 81milliGRAM(s) Oral daily  dronedarone 400milliGRAM(s) Oral daily  finasteride 5milliGRAM(s) Oral daily  tamsulosin 0.4milliGRAM(s) Oral daily  enalapril 5milliGRAM(s) Oral two times a day  atorvastatin 20milliGRAM(s) Oral at bedtime  cefTRIAXone   IVPB 1Gram(s) IV Intermittent daily  ammonium lactate 12% Lotion 1Application(s) Topical two times a day  vancomycin  IVPB  IV Intermittent   vancomycin  IVPB 500milliGRAM(s) IV Intermittent every 12 hours    MEDICATIONS  (PRN):  acetaminophen   Tablet 650milliGRAM(s) Oral every 6 hours PRN For Temp greater than 38 C (100.4 F)      Vital Signs Last 24 Hrs  T(C): 36.7, Max: 36.7 (02-07 @ 22:31)  T(F): 98.1, Max: 98.1 (02-07 @ 22:31)  HR: 60 (60 - 69)  BP: 139/58 (135/45 - 161/64)  BP(mean): --  RR: 18 (18 - 19)  SpO2: 95% (94% - 95%)    Physical Exam:      Constitutional: well developed well nourished  HEENT: NC/AT, EOMI, PERRLA  Neck: supple  Back: no tenderness  Respiratory: clear  Cardiovascular: S1S2 regular, no murmurs  Abdomen: soft, not tender, not distended, positive BS  Genitourinary: deferred  Rectal: deferred  Musculoskeletal: left foot with erythema across dorsum of foot much improved; onchymycosis of toenails  Extremities: mild  pedal edema  Neurological: AxOx3, moving all extremities, no focal deficits  Skin: no rashes                          Labs:                        10.8   5.9   )-----------( 195      ( 08 Feb 2017 06:55 )             33.4     08 Feb 2017 06:55    142    |  108    |  11     ----------------------------<  112    3.6     |  25     |  0.75     Ca    8.3        08 Feb 2017 06:55  Mg     2.0       08 Feb 2017 06:55    TPro  6.7    /  Alb  3.4    /  TBili  0.7    /  DBili  x      /  AST  16     /  ALT  18     /  AlkPhos  85     06 Feb 2017 17:12           Cultures: Culture - Blood (02.06.17 @ 17:12)    Specimen Source: .Blood None    Culture Results:   No growth to date.    Culture - Blood (02.06.17 @ 17:12)    Specimen Source: .Blood None    Culture Results:   No growth to date.            Radiology:Impression: CXR     Likely minimal pulmonary vascular congestion with questionable   superimposed right lower lobe pneumonia
2/7- feels better this morning- no overnight events, as per patient redness is better after antibiotic was started.    REVIEW OF SYSTEMS:    CONSTITUTIONAL: No weakness, fevers or chills  EYES/ENT: No visual changes;  No vertigo or throat pain   NECK: No pain or stiffness  RESPIRATORY: No cough, wheezing, hemoptysis; No shortness of breath  CARDIOVASCULAR: No chest pain or palpitations  GASTROINTESTINAL: No abdominal or epigastric pain. No nausea, vomiting, or hematemesis; No diarrhea or constipation. No melena or hematochezia.  GENITOURINARY: No dysuria, frequency or hematuria  NEUROLOGICAL: No numbness or weakness  SKIN: No itching, burning, rashes, or lesions   All other review of systems is negative unless indicated above.    Vital Signs Last 24 Hrs  T(C): 37, Max: 37 (02-07 @ 08:44)  T(F): 98.6, Max: 98.6 (02-07 @ 08:44)  HR: 63 (63 - 73)  BP: 158/62 (131/63 - 158/62)  BP(mean): --  RR: 18 (18 - 18)  SpO2: 94% (94% - 94%)    MEDICATIONS  (STANDING):  clopidogrel Tablet 75milliGRAM(s) Oral daily  diltiazem   CD 120milliGRAM(s) Oral daily  aspirin  chewable 81milliGRAM(s) Oral daily  dronedarone 400milliGRAM(s) Oral daily  finasteride 5milliGRAM(s) Oral daily  tamsulosin 0.4milliGRAM(s) Oral daily  enalapril 5milliGRAM(s) Oral two times a day  atorvastatin 20milliGRAM(s) Oral at bedtime  cefTRIAXone   IVPB 1Gram(s) IV Intermittent daily  ammonium lactate 12% Lotion 1Application(s) Topical two times a day  vancomycin  IVPB  IV Intermittent   vancomycin  IVPB 500milliGRAM(s) IV Intermittent every 12 hours    MEDICATIONS  (PRN):  acetaminophen   Tablet 650milliGRAM(s) Oral every 6 hours PRN For Temp greater than 38 C (100.4 F)                       10.6   5.5   )-----------( 183      ( 07 Feb 2017 07:25 )             32.8   07 Feb 2017 07:25    144    |  108    |  9      ----------------------------<  102    3.8     |  26     |  0.76     Ca    8.2        07 Feb 2017 07:25    TPro  6.7    /  Alb  3.4    /  TBili  0.7    /  DBili  x      /  AST  16     /  ALT  18     /  AlkPhos  85     06 Feb 2017 17:12        PHYSICAL EXAM:    General:  Alert, well-developed ,No acute distress.    Neuro:  Alert and oriented to person, place, and time. Able to communicate  well. Cranial nerves 2-12 grossly intact. 5/5 strength in all  extremities bilaterally. Sensation intact in all extremities.  Appropriate affect.     HEENT:  No JVD, no masses, Eyes anicteric, No carotid bruits. No lymphadenopathy,    Cardiovascular:  Regular rate and rhythm, with normal S1 and S2. No murmurs, rubs,  or gallops. No JVD.     Lungs:  clear. no rales, no wheezing, .    Abdomen:  Normoactive bowel sounds. Soft, flat, non-tender, and non-distended.  No hepatosplenomegaly, positive bowel sounds  no s/p tenderness    Skin:  Warm, dry, well-perfused. No rashes or other lesions.

## 2017-02-10 VITALS — DIASTOLIC BLOOD PRESSURE: 59 MMHG | SYSTOLIC BLOOD PRESSURE: 129 MMHG | HEART RATE: 63 BPM

## 2017-02-10 RX ORDER — TAMSULOSIN HYDROCHLORIDE 0.4 MG/1
1 CAPSULE ORAL
Qty: 0 | Refills: 0 | COMMUNITY
Start: 2017-02-10

## 2017-02-10 RX ORDER — TAMSULOSIN HYDROCHLORIDE 0.4 MG/1
1 CAPSULE ORAL
Qty: 0 | Refills: 0 | COMMUNITY

## 2017-02-10 RX ORDER — ACETAMINOPHEN 500 MG
2 TABLET ORAL
Qty: 0 | Refills: 0 | COMMUNITY
Start: 2017-02-10

## 2017-02-10 RX ORDER — SOD,AMMONIUM,POTASSIUM LACTATE
1 CREAM (GRAM) TOPICAL
Qty: 1 | Refills: 0 | OUTPATIENT
Start: 2017-02-10

## 2017-02-10 RX ADMIN — Medication 1 APPLICATION(S): at 05:42

## 2017-02-10 RX ADMIN — Medication 100 MILLIGRAM(S): at 05:42

## 2017-02-10 RX ADMIN — Medication 5 MILLIGRAM(S): at 05:41

## 2017-02-10 RX ADMIN — Medication 120 MILLIGRAM(S): at 05:42

## 2017-02-10 RX ADMIN — DRONEDARONE 400 MILLIGRAM(S): 400 TABLET, FILM COATED ORAL at 05:41

## 2017-02-10 RX ADMIN — CLOPIDOGREL BISULFATE 75 MILLIGRAM(S): 75 TABLET, FILM COATED ORAL at 10:55

## 2017-02-10 RX ADMIN — Medication 81 MILLIGRAM(S): at 10:55

## 2017-02-10 RX ADMIN — TAMSULOSIN HYDROCHLORIDE 0.4 MILLIGRAM(S): 0.4 CAPSULE ORAL at 10:55

## 2017-02-10 RX ADMIN — FINASTERIDE 5 MILLIGRAM(S): 5 TABLET, FILM COATED ORAL at 10:55

## 2017-02-10 NOTE — DISCHARGE NOTE ADULT - NS AS ACTIVITY OBS
No Heavy lifting/straining/Walking-Indoors allowed/Driving allowed/Showering allowed/Walking-Outdoors allowed

## 2017-02-10 NOTE — DISCHARGE NOTE ADULT - HOSPITAL COURSE
89 y.o. male with PMH CAD s/p stents, HTN, HLD, aortic stenosis s/p AVR, BPH presents with 10 day hx of L foot redness and swelling.    #L foot cellulitis failed outpatient oral antibiotics  -completed IV ABT  - plan for oral antibiotic doxycycline 100mg twice a day for 7 more days  -elevate legs  -f/u with your Primary physician in 1 week    #CAD s/p stents, AS s/p AVR  -on asa, plavix, cardizem, multaq, enalapril, lipitor  -lipid panel- nl    #BPH  -on finasteride, flomax  - B12/folate-nl    #HTN, HLD  -cont meds    #macrocytic anemia- B12/folate-nl      Plan for discharge home today with instructions to f/u with PCP. Case discussed with Dr Pablo. Plan explained to patient- all of his concerns were addressed. 89 y.o. male with PMH CAD s/p stents, HTN, HLD, aortic stenosis s/p AVR, BPH presents with 10 day hx of L foot redness and swelling.    Vital Signs Last 24 Hrs  T(C): 36.3, Max: 36.3 (02-09 @ 22:34)  T(F): 97.4, Max: 97.4 (02-09 @ 22:34)  HR: 63 (63 - 66)  BP: 129/59 (129/56 - 140/59)  BP(mean): --  RR: 19 (17 - 19)  SpO2: 93% (93% - 93%)    REVIEW OF SYSTEMS:    CONSTITUTIONAL: No weakness, fevers or chills  EYES/ENT: No visual changes;  No vertigo or throat pain   NECK: No pain or stiffness  RESPIRATORY: No cough, wheezing, hemoptysis; No shortness of breath  CARDIOVASCULAR: No chest pain or palpitations  GASTROINTESTINAL: No abdominal or epigastric pain. No nausea, vomiting, or hematemesis; No diarrhea or constipation. No melena or hematochezia.  GENITOURINARY: No dysuria, frequency or hematuria  NEUROLOGICAL: No numbness or weakness  SKIN: No itching, burning, rashes, or lesions   All other review of systems is negative unless indicated above.    PHYSICAL EXAM:    General:Alert, well-developed ,No acute distress.  Neuro:Alert and oriented to person, place, and time. Able to communicate  well.  Sensation intact in all extremities.  Appropriate affect.   HEENT: No JVD, no masses, Eyes anicteric, No carotid bruits. No lymphadenopathy,  Cardiovascular: irregular , with normal S1 and S2. No murmurs, rubs,  or gallops. No JVD.   Lungs:clear. no rales, no wheezing, .  Abdomen: Normoactive bowel sounds. Soft, flat, non-tender, and non-distended.  No hepatosplenomegaly, positive bowel sounds  no s/p tenderness  Skin:Warm, dry, well-perfused. No rashes or other lesions. .       PLAN  #L foot cellulitis failed outpatient oral antibiotics  -completed IV ABT  - plan for oral antibiotic doxycycline 100mg twice a day for 7 more days  -elevate legs  -f/u with your Primary physician in 1 week    #CAD s/p stents, AS s/p AVR  -on asa, plavix, cardizem, multaq, enalapril, lipitor  -lipid panel- nl    #BPH  -on finasteride, flomax  - B12/folate-nl    #HTN, HLD  -cont meds    #macrocytic anemia- B12/folate-nl      Plan for discharge home today with instructions to f/u with PCP. Case discussed with Dr Pablo. Plan explained to patient- all of his concerns were addressed.

## 2017-02-10 NOTE — DISCHARGE NOTE ADULT - CARE PLAN
Principal Discharge DX:	Cellulitis of foot  Goal:	prevent recurrence  Instructions for follow-up, activity and diet:	Do not skip or miss doses.   Call your and notify your PCP of your recent hospitalization. f/u in 1-2 weeks.  Secondary Diagnosis:	Essential hypertension  Goal:	symptom free  Instructions for follow-up, activity and diet:	Continue with your blood pressure medications; eat a heart healthy diet with low salt diet; exercise regularly (consult with your physician or cardiologist first); maintain a heart healthy weight Continue to follow with your primary care physician or cardiologist.  Secondary Diagnosis:	Paroxysmal atrial fibrillation  Goal:	symptom free  Instructions for follow-up, activity and diet:	Atrial fibrillation is the most common heart rhythm problem & has the risk of stroke & heart attack  It helps if you control your blood pressure, not drink more than 1-2 alcohol drinks per day, cut down on caffeine & getting exercise  Call your doctor if you feel your heart racing or beating unusually, chest tightness or pain, lightheaded, faint, shortness of breath especially with exercise  It is important to take your heart medication as prescribed

## 2017-02-10 NOTE — DISCHARGE NOTE ADULT - MEDICATION SUMMARY - MEDICATIONS TO TAKE
I will START or STAY ON the medications listed below when I get home from the hospital:    finasteride 5 mg oral tablet  -- 1 tab(s) by mouth once a day  -- Indication: For BPH    aspirin 81 mg oral tablet  -- 1 tab(s) by mouth once a day  -- Indication: For CAD    acetaminophen 325 mg oral tablet  -- 2 tab(s) by mouth every 6 hours, As needed, For Temp greater than 38 C (100.4 F)  -- Indication: For Pain    enalapril 5 mg oral tablet  -- 1 tab(s) by mouth 2 times a day  -- Indication: For HTN    tamsulosin 0.4 mg oral capsule  -- 1 cap(s) by mouth once a day  -- Indication: For BPH    Cardizem 120 mg oral tablet  --  by mouth   -- Indication: For HTN    Multaq 400 mg oral tablet  -- 1 tab(s) by mouth once a day  -- Indication: For Atrial fibrillation    Lipitor 20 mg oral tablet  -- 1 tab(s) by mouth once a day  -- Indication: For HLD    doxycycline hyclate 100 mg oral delayed release tablet  -- 1 tab(s) by mouth 2 times a day  -- Avoid prolonged or excessive exposure to direct and/or artificial sunlight while taking this medication.  Do not chew, break, or crush.  Do not take dairy products, antacids, or iron preparations within one hour of this medication.  Do not take this drug if you are pregnant.  Finish all this medication unless otherwise directed by prescriber.  Medication should be taken with plenty of water.    -- Indication: For Cellulitis of foot    Plavix 75 mg oral tablet  -- 1 tab(s) by mouth once a day  -- Indication: For CAD    ammonium lactate 12% topical lotion  -- 1 application on skin 2 times a day  -- Indication: For general    potassium chloride 20 mEq oral granule, extended release  --  by mouth   -- Indication: For general    folic acid 1 mg oral tablet  --  by mouth   -- Indication: For general

## 2017-02-10 NOTE — DISCHARGE NOTE ADULT - PLAN OF CARE
prevent recurrence Do not skip or miss doses.   Call your and notify your PCP of your recent hospitalization. f/u in 1-2 weeks. symptom free Continue with your blood pressure medications; eat a heart healthy diet with low salt diet; exercise regularly (consult with your physician or cardiologist first); maintain a heart healthy weight Continue to follow with your primary care physician or cardiologist. Atrial fibrillation is the most common heart rhythm problem & has the risk of stroke & heart attack  It helps if you control your blood pressure, not drink more than 1-2 alcohol drinks per day, cut down on caffeine & getting exercise  Call your doctor if you feel your heart racing or beating unusually, chest tightness or pain, lightheaded, faint, shortness of breath especially with exercise  It is important to take your heart medication as prescribed

## 2017-02-11 LAB
CULTURE RESULTS: SIGNIFICANT CHANGE UP
CULTURE RESULTS: SIGNIFICANT CHANGE UP
SPECIMEN SOURCE: SIGNIFICANT CHANGE UP
SPECIMEN SOURCE: SIGNIFICANT CHANGE UP

## 2017-02-13 DIAGNOSIS — I48.0 PAROXYSMAL ATRIAL FIBRILLATION: ICD-10-CM

## 2017-02-13 DIAGNOSIS — L03.116 CELLULITIS OF LEFT LOWER LIMB: ICD-10-CM

## 2017-02-13 DIAGNOSIS — E78.5 HYPERLIPIDEMIA, UNSPECIFIED: ICD-10-CM

## 2017-02-13 DIAGNOSIS — Z95.5 PRESENCE OF CORONARY ANGIOPLASTY IMPLANT AND GRAFT: ICD-10-CM

## 2017-02-13 DIAGNOSIS — D53.9 NUTRITIONAL ANEMIA, UNSPECIFIED: ICD-10-CM

## 2017-02-13 DIAGNOSIS — N40.0 BENIGN PROSTATIC HYPERPLASIA WITHOUT LOWER URINARY TRACT SYMPTOMS: ICD-10-CM

## 2017-02-13 DIAGNOSIS — I25.10 ATHEROSCLEROTIC HEART DISEASE OF NATIVE CORONARY ARTERY WITHOUT ANGINA PECTORIS: ICD-10-CM

## 2017-02-13 DIAGNOSIS — Z87.891 PERSONAL HISTORY OF NICOTINE DEPENDENCE: ICD-10-CM

## 2017-02-13 DIAGNOSIS — I10 ESSENTIAL (PRIMARY) HYPERTENSION: ICD-10-CM

## 2017-02-13 DIAGNOSIS — B35.1 TINEA UNGUIUM: ICD-10-CM

## 2017-02-13 DIAGNOSIS — Z96.642 PRESENCE OF LEFT ARTIFICIAL HIP JOINT: ICD-10-CM

## 2017-03-24 ENCOUNTER — EMERGENCY (EMERGENCY)
Facility: HOSPITAL | Age: 82
LOS: 0 days | Discharge: HOME | End: 2017-03-24
Attending: EMERGENCY MEDICINE | Admitting: EMERGENCY MEDICINE
Payer: MEDICARE

## 2017-03-24 VITALS
RESPIRATION RATE: 17 BRPM | SYSTOLIC BLOOD PRESSURE: 146 MMHG | OXYGEN SATURATION: 97 % | TEMPERATURE: 98 F | DIASTOLIC BLOOD PRESSURE: 69 MMHG | HEART RATE: 64 BPM

## 2017-03-24 VITALS — WEIGHT: 209 LBS

## 2017-03-24 DIAGNOSIS — R04.0 EPISTAXIS: ICD-10-CM

## 2017-03-24 DIAGNOSIS — I25.10 ATHEROSCLEROTIC HEART DISEASE OF NATIVE CORONARY ARTERY WITHOUT ANGINA PECTORIS: ICD-10-CM

## 2017-03-24 DIAGNOSIS — Z79.01 LONG TERM (CURRENT) USE OF ANTICOAGULANTS: ICD-10-CM

## 2017-03-24 PROCEDURE — 99283 EMERGENCY DEPT VISIT LOW MDM: CPT

## 2017-03-24 NOTE — ED STATDOCS - MEDICAL DECISION MAKING DETAILS
Plan further eval in super tract. Plan further eval in super tract.  Tirstian MDM: Epistaxis.  Likely anterior but currently resolved after removing tissues from nose.  Pt monitored and has no epistaxis here in ED for over one hour.  No obvious areas to benefit from cautery.  ENT f/u, otherwise return if bleeding returns and dose not stop after leaning forward and compressing nares together.

## 2017-03-24 NOTE — ED STATDOCS - OBJECTIVE STATEMENT
89 y/o male with PMhx of CAD with stents, on plavix and ASA s/p bilateral hip replacements presents to the ED c/o epistaxis that started this afternoon and lasted about 1.5 hours. He states that he has been placing tissues in his nose to stop the bleeding. Pt went to PMD office this morning and was told to come to the ED. Currently pt has no other complaints and denies chest pain, SOB, abd pain, and n/v/d.

## 2017-03-24 NOTE — ED STATDOCS - NS ED MD SCRIBE ATTENDING SCRIBE SECTIONS
RESULTS/HISTORY OF PRESENT ILLNESS/PHYSICAL EXAM/DISPOSITION/REVIEW OF SYSTEMS/PAST MEDICAL/SURGICAL/SOCIAL HISTORY/PROGRESS NOTE

## 2017-03-24 NOTE — ED STATDOCS - ENMT, MLM
Mouth with normal mucosa  Throat has no vesicles, no oropharyngeal exudates and uvula is midline. Left nostril clear, no blood to the posterior pharynx. Tissue paper stuffed in anterior right nostril with no active hemorrhaging.

## 2017-03-24 NOTE — ED STATDOCS - PROGRESS NOTE DETAILS
patient seen and evaluated, removed packing that patient had placed in his nose, large clot removed, no active bleeding, some dried blood in nare, no bleeding down posterior oropharynx, will reassess and pack if necessary -Junior Segal PA-C patient walked around the ED, monitored for about 1 hour, no sign of bleeding, reviewed with patient what to do if nose bleeds and when to come back to ED, patient -Junior Segal PA-C patient walked around the ED, monitored for about 1 hour, no sign of bleeding, reviewed with patient what to do if nose bleeds and when to come back to ED, patient has appt with ENT Dr. Vaughn in 3 days -Junior Segal PA-C

## 2017-06-19 ENCOUNTER — EMERGENCY (EMERGENCY)
Facility: HOSPITAL | Age: 82
LOS: 0 days | Discharge: HOME | End: 2017-06-19
Attending: EMERGENCY MEDICINE | Admitting: EMERGENCY MEDICINE
Payer: MEDICARE

## 2017-06-19 VITALS
DIASTOLIC BLOOD PRESSURE: 66 MMHG | SYSTOLIC BLOOD PRESSURE: 139 MMHG | RESPIRATION RATE: 18 BRPM | HEART RATE: 78 BPM | OXYGEN SATURATION: 95 % | TEMPERATURE: 99 F

## 2017-06-19 VITALS
RESPIRATION RATE: 15 BRPM | SYSTOLIC BLOOD PRESSURE: 159 MMHG | HEIGHT: 70 IN | WEIGHT: 199.96 LBS | DIASTOLIC BLOOD PRESSURE: 65 MMHG | OXYGEN SATURATION: 93 % | TEMPERATURE: 99 F | HEART RATE: 71 BPM

## 2017-06-19 DIAGNOSIS — I48.91 UNSPECIFIED ATRIAL FIBRILLATION: ICD-10-CM

## 2017-06-19 DIAGNOSIS — Z79.02 LONG TERM (CURRENT) USE OF ANTITHROMBOTICS/ANTIPLATELETS: ICD-10-CM

## 2017-06-19 DIAGNOSIS — I10 ESSENTIAL (PRIMARY) HYPERTENSION: ICD-10-CM

## 2017-06-19 DIAGNOSIS — I25.10 ATHEROSCLEROTIC HEART DISEASE OF NATIVE CORONARY ARTERY WITHOUT ANGINA PECTORIS: ICD-10-CM

## 2017-06-19 DIAGNOSIS — M25.561 PAIN IN RIGHT KNEE: ICD-10-CM

## 2017-06-19 PROCEDURE — 99284 EMERGENCY DEPT VISIT MOD MDM: CPT

## 2017-06-19 PROCEDURE — 73562 X-RAY EXAM OF KNEE 3: CPT | Mod: 26,RT

## 2017-06-19 RX ORDER — IBUPROFEN 200 MG
1 TABLET ORAL
Qty: 30 | Refills: 0 | OUTPATIENT
Start: 2017-06-19

## 2017-06-19 RX ORDER — IBUPROFEN 200 MG
400 TABLET ORAL ONCE
Qty: 0 | Refills: 0 | Status: COMPLETED | OUTPATIENT
Start: 2017-06-19 | End: 2017-06-19

## 2017-06-19 RX ADMIN — Medication 400 MILLIGRAM(S): at 14:45

## 2017-06-19 NOTE — ED PROVIDER NOTE - CONSTITUTIONAL, MLM
normal... Well appearing, elderly male, awake, alert, oriented to person, place, time/situation and in no apparent distress.

## 2017-06-19 NOTE — ED PROVIDER NOTE - MEDICAL DECISION MAKING DETAILS
Pt currently calm, able to give adequate hx, c/o worsening R knee pain with no known trauma with plans to receive NSAIDs, xray imaging for further eval and tx.

## 2017-06-19 NOTE — ED PROVIDER NOTE - OBJECTIVE STATEMENT
89 y/o M with a PMHx of AFIB, HTN, CAD presents to the ED c/o worsening RLE knee pain over the past few days. Pt states that he was out and about walking through [place] this past weekend and aggravated his knee. Pt currently calm, able to give adequate hx and denies trauma to knee, fall, CP, SOB, fever, cough, chills or any other acute c/o at this time.

## 2017-11-09 ENCOUNTER — EMERGENCY (EMERGENCY)
Facility: HOSPITAL | Age: 82
LOS: 0 days | Discharge: HOME | End: 2017-11-10
Attending: FAMILY MEDICINE | Admitting: FAMILY MEDICINE
Payer: MEDICARE

## 2017-11-09 VITALS
OXYGEN SATURATION: 96 % | DIASTOLIC BLOOD PRESSURE: 67 MMHG | TEMPERATURE: 98 F | HEART RATE: 90 BPM | RESPIRATION RATE: 16 BRPM | WEIGHT: 212.08 LBS | SYSTOLIC BLOOD PRESSURE: 160 MMHG

## 2017-11-09 DIAGNOSIS — S89.82XA OTHER SPECIFIED INJURIES OF LEFT LOWER LEG, INITIAL ENCOUNTER: ICD-10-CM
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
## 2017-11-09 DIAGNOSIS — R29.6 REPEATED FALLS: ICD-10-CM

## 2017-11-09 DIAGNOSIS — Z79.02 LONG TERM (CURRENT) USE OF ANTITHROMBOTICS/ANTIPLATELETS: ICD-10-CM

## 2017-11-09 DIAGNOSIS — S80.02XA CONTUSION OF LEFT KNEE, INITIAL ENCOUNTER: ICD-10-CM

## 2017-11-09 DIAGNOSIS — Z79.82 LONG TERM (CURRENT) USE OF ASPIRIN: ICD-10-CM

## 2017-11-09 DIAGNOSIS — Z95.2 PRESENCE OF PROSTHETIC HEART VALVE: ICD-10-CM

## 2017-11-09 DIAGNOSIS — I10 ESSENTIAL (PRIMARY) HYPERTENSION: ICD-10-CM
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
## 2017-11-09 PROCEDURE — 93010 ELECTROCARDIOGRAM REPORT: CPT

## 2017-11-09 PROCEDURE — 99285 EMERGENCY DEPT VISIT HI MDM: CPT

## 2017-11-09 PROCEDURE — 73562 X-RAY EXAM OF KNEE 3: CPT | Mod: 26,RT

## 2017-11-09 RX ORDER — TETANUS TOXOID, REDUCED DIPHTHERIA TOXOID AND ACELLULAR PERTUSSIS VACCINE, ADSORBED 5; 2.5; 8; 8; 2.5 [IU]/.5ML; [IU]/.5ML; UG/.5ML; UG/.5ML; UG/.5ML
0.5 SUSPENSION INTRAMUSCULAR ONCE
Qty: 0 | Refills: 0 | Status: COMPLETED | OUTPATIENT
Start: 2017-11-09 | End: 2017-11-09

## 2017-11-09 NOTE — ED PROVIDER NOTE - PROGRESS NOTE DETAILS
JG:  received signout from Dr. Nevarez to follow up CT of knee which did not show acute fracture.  Per Nurse. pt. stood at bedside and walked around his room in the ED. Son will be called for discharge. Dr. Nevarez still in ED and spoke with son who does not feel comfortable taking patient home at this time.  Social work consult ordered for home care assistance, outpatient physical therapy, vs inpatient rehab. Pt evaluated by PT and .  Pt able to ambulated around ED and step up.  Pt cleared for d/c.  [place], DO

## 2017-11-09 NOTE — ED ADULT TRIAGE NOTE - CHIEF COMPLAINT QUOTE
Unsteady gait, noncomplaint with walker. Trip and fall 3 days ago and today. Complaining of right knee pain. On plavix and ASA.

## 2017-11-09 NOTE — ED PROVIDER NOTE - OBJECTIVE STATEMENT
89 y/o male with hx of   presents to the ED c/o   +  Denies 89 y/o male with hx of HTN, Aortic valve replacement due to leak, but it is still leaking presents to the ED s/p fall at about 7pm tonight at home after losing his balance. Pt was unable to get up after the fall and was able to get to his phone to call his son. Pt hurt his right knee when he fell. Pt fell three days ago when he slipped in his kitchen hurting his right knee. +right knee ecchymosis and swelling. +Abrasion. PCP- [facility]. On Plavix and 81mg ASA.

## 2017-11-10 VITALS
RESPIRATION RATE: 18 BRPM | OXYGEN SATURATION: 100 % | HEART RATE: 84 BPM | DIASTOLIC BLOOD PRESSURE: 78 MMHG | SYSTOLIC BLOOD PRESSURE: 155 MMHG

## 2017-11-10 PROCEDURE — 73700 CT LOWER EXTREMITY W/O DYE: CPT | Mod: 26,RT

## 2017-11-10 PROCEDURE — 76377 3D RENDER W/INTRP POSTPROCES: CPT | Mod: 26

## 2017-11-10 RX ORDER — FUROSEMIDE 40 MG
40 TABLET ORAL ONCE
Qty: 0 | Refills: 0 | Status: COMPLETED | OUTPATIENT
Start: 2017-11-10 | End: 2017-11-10

## 2017-11-10 RX ORDER — ASPIRIN/CALCIUM CARB/MAGNESIUM 324 MG
81 TABLET ORAL ONCE
Qty: 0 | Refills: 0 | Status: COMPLETED | OUTPATIENT
Start: 2017-11-10 | End: 2017-11-10

## 2017-11-10 RX ORDER — CLOPIDOGREL BISULFATE 75 MG/1
75 TABLET, FILM COATED ORAL DAILY
Qty: 0 | Refills: 0 | Status: DISCONTINUED | OUTPATIENT
Start: 2017-11-10 | End: 2017-11-10

## 2017-11-10 RX ORDER — DRONEDARONE 400 MG/1
400 TABLET, FILM COATED ORAL ONCE
Qty: 0 | Refills: 0 | Status: COMPLETED | OUTPATIENT
Start: 2017-11-10 | End: 2017-11-10

## 2017-11-10 RX ORDER — FINASTERIDE 5 MG/1
5 TABLET, FILM COATED ORAL ONCE
Qty: 0 | Refills: 0 | Status: COMPLETED | OUTPATIENT
Start: 2017-11-10 | End: 2017-11-10

## 2017-11-10 RX ORDER — DILTIAZEM HCL 120 MG
120 CAPSULE, EXT RELEASE 24 HR ORAL ONCE
Qty: 0 | Refills: 0 | Status: COMPLETED | OUTPATIENT
Start: 2017-11-10 | End: 2017-11-10

## 2017-11-10 RX ADMIN — DRONEDARONE 400 MILLIGRAM(S): 400 TABLET, FILM COATED ORAL at 11:08

## 2017-11-10 RX ADMIN — FINASTERIDE 5 MILLIGRAM(S): 5 TABLET, FILM COATED ORAL at 11:08

## 2017-11-10 RX ADMIN — Medication 5 MILLIGRAM(S): at 11:08

## 2017-11-10 RX ADMIN — Medication 81 MILLIGRAM(S): at 11:08

## 2017-11-10 RX ADMIN — TETANUS TOXOID, REDUCED DIPHTHERIA TOXOID AND ACELLULAR PERTUSSIS VACCINE, ADSORBED 0.5 MILLILITER(S): 5; 2.5; 8; 8; 2.5 SUSPENSION INTRAMUSCULAR at 01:17

## 2017-11-10 RX ADMIN — CLOPIDOGREL BISULFATE 75 MILLIGRAM(S): 75 TABLET, FILM COATED ORAL at 11:08

## 2017-11-10 RX ADMIN — Medication 40 MILLIGRAM(S): at 11:09

## 2017-11-10 RX ADMIN — Medication 120 MILLIGRAM(S): at 11:09

## 2017-11-10 NOTE — PHYSICAL THERAPY INITIAL EVALUATION ADULT - DISCHARGE DISPOSITION, PT EVAL
home/Patient is to be DC home, ED called PT department for Stairs consult at daughters request.  PT assessed stairs:  Patient ambulated 8 steps with one hand on rail- up and down independently with out assistive device.  Ambulates I without assistive device.

## 2017-11-10 NOTE — ED ADULT NURSE REASSESSMENT NOTE - NS ED NURSE REASSESS COMMENT FT1
pt medically cleared for discharge. As per pts son Farhan Campo's request, pt will be seen by social work in the am. ace wrap applied to left knee as per MD request.  Pt currently resting comfortably in bed. Pt given warm blanket and lights dimmed as per pt request. Safety maintained. will continue to monitor.

## 2017-11-10 NOTE — ED ADULT NURSE REASSESSMENT NOTE - NS ED NURSE REASSESS COMMENT FT1
Daughter here to  pt for discharge , dtr very upset pt not being admitted. Katie Patel RN ANM speaking with family at present. Pt family had previously spoken with Case Management.

## 2017-11-10 NOTE — ED ADULT NURSE REASSESSMENT NOTE - NS ED NURSE REASSESS COMMENT FT1
pt awake and asking when breakfast is.  pt able to sleep throughout night with minimal interruptions. pt awaiting SW consult this am. safety maintained. will continue to monitor.

## 2018-01-25 NOTE — ED STATDOCS - NSCAREINITIATED _GEN_ER
Alyssa Batista is an 52year old male. He presents today for his annual physical. Since I saw him, he did have a urological consultation. He was eventually started on testosterone. He feels that it helped significantly fairly rapidly. Now just over the past month or so he feels that his energy level is falling off a little bit. He does have lab and follow-up ordered by Dr. Johnny Dubon for the near future. He also has been having some low back pain. He has been seeing a chiropractor and it is getting a little better. Coincidentally perhaps, he has also been having some lower abdominal discomfort. Sometimes it'll wake him up at night and causes him to change position. Sometimes he experiences it during the day and will sometimes get better if he straightens his posture. 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 His symptoms have no seeming relationship to voiding or bowel movements. Current Outpatient Prescriptions   Medication Sig Dispense Refill   â¢ testosterone (ANDROGEL) 20.25 MG/1.25GM (1.62%) gel Place onto the skin daily. â¢ Multiple Vitamins-Minerals (MULTIVITAMIN ADULT PO) Take by mouth daily. â¢ ibuprofen 200 MG capsule Take 200 mg by mouth every 6 hours as needed for Pain. â¢ loratadine (CLARITIN) 10 MG tablet Take 10 mg by mouth daily. No current facility-administered medications for this visit. History reviewed. No pertinent past medical history. History reviewed. No pertinent surgical history. History reviewed. No pertinent family history.   Social History   Substance Use Topics   â¢ Smoking status: Former Smoker     Quit date: 12/29/1996   â¢ Smokeless tobacco: Never Used   â¢ Alcohol use Not on file       Prior to Admission Meds:  (Not in a hospital admission)  Scheduled Meds:  Continuous Infusions:  PRN Meds:    Allergies:   ALLERGIES:   Allergen Reactions   â¢ Penicillins Other (See Comments)       Active "Problems:    * No active hospital problems. *    Blood pressure 138/88, pulse 72, resp. rate 18, height 5' 11"" (1.803 m), weight 104.6 kg. Review of Systems   Constitutional: Negative. HENT: Negative. Eyes: Negative. Respiratory: Negative. Cardiovascular: Negative. Gastrointestinal:        Relatively recently he has had some discomfort in the lower abdominal region. Sometimes this makes him change position when in bed at night. Sometimes he experiences it during the day and will get better if he changes position or straightens up his posture. He had been seeing a    Fracture for some back issues recently. Genitourinary: Negative. Musculoskeletal: Positive for back pain. Negative for falls, joint pain, myalgias and neck pain. Skin: Negative. Neurological: Negative. Endo/Heme/Allergies: Negative. Psychiatric/Behavioral: Negative. Physical Exam   Constitutional: He is oriented to person, place, and time. He appears well-developed and well-nourished. No distress. HENT:   Head: Normocephalic and atraumatic. Right Ear: External ear normal.   Left Ear: External ear normal.   Nose: Nose normal.   Mouth/Throat: Oropharynx is clear and moist. No oropharyngeal exudate. Eyes: Conjunctivae and EOM are normal. Pupils are equal, round, and reactive to light. Right eye exhibits no discharge. Left eye exhibits no discharge. No scleral icterus. Neck: Normal range of motion. Neck supple. No JVD present. No tracheal deviation present. No thyromegaly present. Cardiovascular: Normal rate, regular rhythm, normal heart sounds and intact distal pulses. Exam reveals no gallop and no friction rub. No murmur heard. Pulmonary/Chest: Effort normal and breath sounds normal. No stridor. No respiratory distress. He has no wheezes. He has no rales. He exhibits no tenderness. Abdominal: Soft. Bowel sounds are normal. He exhibits no distension and no mass. There is no tenderness.  There is no " Reilly Lubin(Attending) rebound and no guarding. Perhaps the mildest of discomfort in palpating the lower abdomen. Genitourinary: Penis normal.   Genitourinary Comments: Negative inguinal hernia exam. Testes descended without masses. Musculoskeletal: Normal range of motion. He exhibits no edema, tenderness or deformity. Lymphadenopathy:     He has no cervical adenopathy. Neurological: He is alert and oriented to person, place, and time. He has normal reflexes. He displays normal reflexes. No cranial nerve deficit. He exhibits normal muscle tone. Coordination normal.   Skin: Skin is warm and dry. No rash noted. He is not diaphoretic. No erythema. No pallor. Psychiatric: He has a normal mood and affect. His behavior is normal. Judgment and thought content normal.   Vitals reviewed. Assessment:  Annual physical examination. Testosterone being replaced by urology. Recent lower abdominal discomfort. Suspect positional.      Plan:  Aleve 2 p.o. b.i.d. for 2 weeks or so and see if that helps with his lower abdominal complaints. Proceed with blood work ordered by his urologist. If this is normal and his discomfort continues would probably then proceed with colonoscopy which he is almost due for anyway.     Clarice Puga MD  1/25/2018

## 2018-02-02 ENCOUNTER — EMERGENCY (EMERGENCY)
Facility: HOSPITAL | Age: 83
LOS: 0 days | Discharge: HOME | End: 2018-02-02
Attending: EMERGENCY MEDICINE | Admitting: EMERGENCY MEDICINE
Payer: MEDICARE

## 2018-02-02 VITALS
TEMPERATURE: 98 F | RESPIRATION RATE: 17 BRPM | HEART RATE: 88 BPM | DIASTOLIC BLOOD PRESSURE: 77 MMHG | SYSTOLIC BLOOD PRESSURE: 138 MMHG | OXYGEN SATURATION: 93 %

## 2018-02-02 VITALS — WEIGHT: 179.9 LBS | HEIGHT: 68 IN
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
## 2018-02-02 DIAGNOSIS — I25.10 ATHEROSCLEROTIC HEART DISEASE OF NATIVE CORONARY ARTERY WITHOUT ANGINA PECTORIS: ICD-10-CM

## 2018-02-02 DIAGNOSIS — Z79.82 LONG TERM (CURRENT) USE OF ASPIRIN: ICD-10-CM
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
## 2018-02-02 DIAGNOSIS — S89.81XA OTHER SPECIFIED INJURIES OF RIGHT LOWER LEG, INITIAL ENCOUNTER: ICD-10-CM

## 2018-02-02 DIAGNOSIS — Z79.899 OTHER LONG TERM (CURRENT) DRUG THERAPY: ICD-10-CM

## 2018-02-02 PROCEDURE — 99284 EMERGENCY DEPT VISIT MOD MDM: CPT

## 2018-02-02 PROCEDURE — 73564 X-RAY EXAM KNEE 4 OR MORE: CPT | Mod: 26,RT

## 2018-02-02 RX ORDER — TETANUS AND DIPHTHERIA TOXOIDS ADSORBED 2; 2 [LF]/.5ML; [LF]/.5ML
0.5 INJECTION INTRAMUSCULAR ONCE
Qty: 0 | Refills: 0 | Status: COMPLETED | OUTPATIENT
Start: 2018-02-02 | End: 2018-02-02

## 2018-02-02 RX ORDER — TETANUS TOXOID, REDUCED DIPHTHERIA TOXOID AND ACELLULAR PERTUSSIS VACCINE, ADSORBED 5; 2.5; 8; 8; 2.5 [IU]/.5ML; [IU]/.5ML; UG/.5ML; UG/.5ML; UG/.5ML
0.5 SUSPENSION INTRAMUSCULAR ONCE
Qty: 0 | Refills: 0 | Status: DISCONTINUED | OUTPATIENT
Start: 2018-02-02 | End: 2018-02-02

## 2018-02-02 RX ORDER — ACETAMINOPHEN 500 MG
650 TABLET ORAL ONCE
Qty: 0 | Refills: 0 | Status: COMPLETED | OUTPATIENT
Start: 2018-02-02 | End: 2018-02-02

## 2018-02-02 RX ADMIN — Medication 650 MILLIGRAM(S): at 16:31

## 2018-02-02 RX ADMIN — TETANUS AND DIPHTHERIA TOXOIDS ADSORBED 0.5 MILLILITER(S): 2; 2 INJECTION INTRAMUSCULAR at 16:32

## 2018-02-02 NOTE — ED STATDOCS - CARE PLAN
Principal Discharge DX:	Right knee injury  Assessment and plan of treatment:	During your ED visit you were evaluated for right knee pain. You had xrays of your knee completed. ou were placed in a right knee immobilizer. Ambulate with crutches. Take tylenol as needed for pain. Follow up with Orthopedics Dr. Keene within 1 week. [phone]. Follow up with your pcp within 1 week. Return to the ED if you exhibit any new, continued or worsening symptoms.

## 2018-02-02 NOTE — ED STATDOCS - NS_ ATTENDINGSCRIBEDETAILS _ED_A_ED_FT
I, Brijesh Love MD,  performed the initial face to face bedside interview with this patient regarding history of present illness, review of symptoms and relevant past medical, social and family history.  I completed an independent physical examination.    The history, relevant review of systems, past medical and surgical history, medical decision making, and physical examination was documented by the scribe in my presence and I attest to the accuracy of the documentation.

## 2018-02-02 NOTE — ED STATDOCS - ATTENDING CONTRIBUTION TO CARE
I, Brijesh Love MD,  performed the initial face to face bedside interview with this patient regarding history of present illness, review of symptoms and relevant past medical, social and family history.  I completed an independent physical examination.  I was the initial provider who evaluated this patient. I have signed out the follow up of any pending tests (i.e. labs, radiological studies) to the resident.  I have communicated the patient’s plan of care and disposition with the resident.

## 2018-02-02 NOTE — ED STATDOCS - MUSCULOSKELETAL, MLM
range of motion is not limited and there is no muscle tenderness. Abrasion to knee cap of right knee. No deformities.

## 2018-02-02 NOTE — ED ADULT NURSE NOTE - OBJECTIVE STATEMENT
pt presents to ED with right knee pain s/p slip and fall on ice today. denies hitting his head. pt denies LOC. a&ox3.

## 2018-02-02 NOTE — ED STATDOCS - OBJECTIVE STATEMENT
89 y-o Male with PMHC of CAD presents to the ED c/o right knee pain s/p slip and fall. Pt states they landed on their knee when they fell and was un able to get up after fall. Pt was helped up by neighbor and was able to ambulate after incident. Denies hitting head. Denies LOC. On Plavix. Takes baby Asprin daily. Denies CP, SOB

## 2018-02-02 NOTE — ED STATDOCS - PROGRESS NOTE DETAILS
Xray completed, patient w/ pain with walking. Right knee placed in Ace wrap and knee immobilizer. Ambulation with crutches. Patient will f/u with ortho Dr. darling. Walked with knee immobilizer and crutches will f/u with ortho Kate BUCK: Pt comes to the Ed complaining of right knee pain. Mild swelling to the knee. NVID.

## 2018-04-18 ENCOUNTER — EMERGENCY (EMERGENCY)
Facility: HOSPITAL | Age: 83
LOS: 0 days | Discharge: ANOTHER HOSPITAL | End: 2018-04-18
Attending: EMERGENCY MEDICINE | Admitting: EMERGENCY MEDICINE
Payer: MEDICARE

## 2018-04-18 ENCOUNTER — INPATIENT (INPATIENT)
Facility: HOSPITAL | Age: 83
LOS: 5 days | Discharge: HOME | DRG: 435 | End: 2018-04-24
Attending: HOSPITALIST | Admitting: HOSPITALIST
Payer: MEDICARE

## 2018-04-18 VITALS
SYSTOLIC BLOOD PRESSURE: 144 MMHG | TEMPERATURE: 98 F | HEART RATE: 75 BPM | DIASTOLIC BLOOD PRESSURE: 79 MMHG | RESPIRATION RATE: 18 BRPM | OXYGEN SATURATION: 100 %

## 2018-04-18 VITALS
HEART RATE: 70 BPM | TEMPERATURE: 98 F | OXYGEN SATURATION: 100 % | SYSTOLIC BLOOD PRESSURE: 157 MMHG | DIASTOLIC BLOOD PRESSURE: 85 MMHG | RESPIRATION RATE: 18 BRPM

## 2018-04-18 VITALS
DIASTOLIC BLOOD PRESSURE: 81 MMHG | HEIGHT: 72 IN | SYSTOLIC BLOOD PRESSURE: 146 MMHG | WEIGHT: 207.9 LBS | OXYGEN SATURATION: 95 % | RESPIRATION RATE: 18 BRPM | TEMPERATURE: 98 F | HEART RATE: 75 BPM

## 2018-04-18 DIAGNOSIS — N40.0 BENIGN PROSTATIC HYPERPLASIA WITHOUT LOWER URINARY TRACT SYMPTOMS: ICD-10-CM

## 2018-04-18 DIAGNOSIS — R17 UNSPECIFIED JAUNDICE: ICD-10-CM

## 2018-04-18 DIAGNOSIS — K86.9 DISEASE OF PANCREAS, UNSPECIFIED: ICD-10-CM

## 2018-04-18 DIAGNOSIS — I25.10 ATHEROSCLEROTIC HEART DISEASE OF NATIVE CORONARY ARTERY WITHOUT ANGINA PECTORIS: ICD-10-CM

## 2018-04-18 DIAGNOSIS — Z29.9 ENCOUNTER FOR PROPHYLACTIC MEASURES, UNSPECIFIED: ICD-10-CM

## 2018-04-18 LAB
ADD ON TEST-SPECIMEN IN LAB: SIGNIFICANT CHANGE UP
ALBUMIN SERPL ELPH-MCNC: 2.7 G/DL — LOW (ref 3.3–5)
ALP SERPL-CCNC: 773 U/L — HIGH (ref 40–120)
ALT FLD-CCNC: 206 U/L — HIGH (ref 12–78)
ANION GAP SERPL CALC-SCNC: 8 MMOL/L — SIGNIFICANT CHANGE UP (ref 5–17)
APTT BLD: 41.6 SEC — HIGH (ref 27.5–37.4)
AST SERPL-CCNC: 177 U/L — HIGH (ref 15–37)
BASOPHILS # BLD AUTO: 0 K/UL — SIGNIFICANT CHANGE UP (ref 0–0.2)
BASOPHILS NFR BLD AUTO: 0 % — SIGNIFICANT CHANGE UP (ref 0–2)
BILIRUB SERPL-MCNC: 17.1 MG/DL — HIGH (ref 0.2–1.2)
BUN SERPL-MCNC: 18 MG/DL — SIGNIFICANT CHANGE UP (ref 7–23)
CALCIUM SERPL-MCNC: 9.1 MG/DL — SIGNIFICANT CHANGE UP (ref 8.5–10.1)
CHLORIDE SERPL-SCNC: 103 MMOL/L — SIGNIFICANT CHANGE UP (ref 96–108)
CO2 SERPL-SCNC: 27 MMOL/L — SIGNIFICANT CHANGE UP (ref 22–31)
CREAT SERPL-MCNC: 1 MG/DL — SIGNIFICANT CHANGE UP (ref 0.5–1.3)
EOSINOPHIL # BLD AUTO: 0 K/UL — SIGNIFICANT CHANGE UP (ref 0–0.5)
EOSINOPHIL NFR BLD AUTO: 0 % — SIGNIFICANT CHANGE UP (ref 0–6)
GLUCOSE SERPL-MCNC: 128 MG/DL — HIGH (ref 70–99)
HCT VFR BLD CALC: 36.3 % — LOW (ref 39–50)
HGB BLD-MCNC: 13.3 G/DL — SIGNIFICANT CHANGE UP (ref 13–17)
INR BLD: 1.76 RATIO — HIGH (ref 0.88–1.16)
LIDOCAIN IGE QN: 83 U/L — SIGNIFICANT CHANGE UP (ref 73–393)
LYMPHOCYTES # BLD AUTO: 1.14 K/UL — SIGNIFICANT CHANGE UP (ref 1–3.3)
LYMPHOCYTES # BLD AUTO: 19 % — SIGNIFICANT CHANGE UP (ref 13–44)
MCHC RBC-ENTMCNC: 35 PG — HIGH (ref 27–34)
MCHC RBC-ENTMCNC: 36.6 GM/DL — HIGH (ref 32–36)
MCV RBC AUTO: 95.5 FL — SIGNIFICANT CHANGE UP (ref 80–100)
MONOCYTES # BLD AUTO: 0.54 K/UL — SIGNIFICANT CHANGE UP (ref 0–0.9)
MONOCYTES NFR BLD AUTO: 9 % — SIGNIFICANT CHANGE UP (ref 2–14)
NEUTROPHILS # BLD AUTO: 4.31 K/UL — SIGNIFICANT CHANGE UP (ref 1.8–7.4)
NEUTROPHILS NFR BLD AUTO: 72 % — SIGNIFICANT CHANGE UP (ref 43–77)
NRBC # BLD: 0 /100 — SIGNIFICANT CHANGE UP (ref 0–0)
NRBC # BLD: SIGNIFICANT CHANGE UP /100 WBCS (ref 0–0)
PLAT MORPH BLD: NORMAL — SIGNIFICANT CHANGE UP
PLATELET # BLD AUTO: 280 K/UL — SIGNIFICANT CHANGE UP (ref 150–400)
POTASSIUM SERPL-MCNC: 4.1 MMOL/L — SIGNIFICANT CHANGE UP (ref 3.5–5.3)
POTASSIUM SERPL-SCNC: 4.1 MMOL/L — SIGNIFICANT CHANGE UP (ref 3.5–5.3)
PROT SERPL-MCNC: 6.4 GM/DL — SIGNIFICANT CHANGE UP (ref 6–8.3)
PROTHROM AB SERPL-ACNC: 19.2 SEC — HIGH (ref 9.8–12.7)
RBC # BLD: 3.8 M/UL — LOW (ref 4.2–5.8)
RBC # FLD: 19.2 % — HIGH (ref 10.3–14.5)
RBC BLD AUTO: NORMAL — SIGNIFICANT CHANGE UP
SODIUM SERPL-SCNC: 138 MMOL/L — SIGNIFICANT CHANGE UP (ref 135–145)
WBC # BLD: 5.99 K/UL — SIGNIFICANT CHANGE UP (ref 3.8–10.5)
WBC # FLD AUTO: 5.99 K/UL — SIGNIFICANT CHANGE UP (ref 3.8–10.5)

## 2018-04-18 PROCEDURE — 74183 MRI ABD W/O CNTR FLWD CNTR: CPT | Mod: 26

## 2018-04-18 PROCEDURE — 76705 ECHO EXAM OF ABDOMEN: CPT | Mod: 26

## 2018-04-18 PROCEDURE — 99284 EMERGENCY DEPT VISIT MOD MDM: CPT

## 2018-04-18 PROCEDURE — 99285 EMERGENCY DEPT VISIT HI MDM: CPT

## 2018-04-18 PROCEDURE — 93010 ELECTROCARDIOGRAM REPORT: CPT

## 2018-04-18 PROCEDURE — 99223 1ST HOSP IP/OBS HIGH 75: CPT

## 2018-04-18 RX ORDER — TAMSULOSIN HYDROCHLORIDE 0.4 MG/1
0.4 CAPSULE ORAL DAILY
Qty: 0 | Refills: 0 | Status: DISCONTINUED | OUTPATIENT
Start: 2018-04-18 | End: 2018-04-24

## 2018-04-18 RX ORDER — PHYTONADIONE (VIT K1) 5 MG
2.5 TABLET ORAL ONCE
Qty: 0 | Refills: 0 | Status: COMPLETED | OUTPATIENT
Start: 2018-04-18 | End: 2018-04-19

## 2018-04-18 RX ORDER — ATORVASTATIN CALCIUM 80 MG/1
20 TABLET, FILM COATED ORAL AT BEDTIME
Qty: 0 | Refills: 0 | Status: DISCONTINUED | OUTPATIENT
Start: 2018-04-18 | End: 2018-04-20

## 2018-04-18 RX ORDER — CLOPIDOGREL BISULFATE 75 MG/1
75 TABLET, FILM COATED ORAL DAILY
Qty: 0 | Refills: 0 | Status: DISCONTINUED | OUTPATIENT
Start: 2018-04-18 | End: 2018-04-18

## 2018-04-18 RX ORDER — POTASSIUM CHLORIDE 20 MEQ
0 PACKET (EA) ORAL
Qty: 0 | Refills: 0 | COMMUNITY

## 2018-04-18 RX ORDER — ASPIRIN/CALCIUM CARB/MAGNESIUM 324 MG
1 TABLET ORAL
Qty: 0 | Refills: 0 | COMMUNITY

## 2018-04-18 RX ORDER — DRONEDARONE 400 MG/1
400 TABLET, FILM COATED ORAL
Qty: 0 | Refills: 0 | Status: DISCONTINUED | OUTPATIENT
Start: 2018-04-18 | End: 2018-04-23

## 2018-04-18 RX ORDER — FOLIC ACID 0.8 MG
1 TABLET ORAL DAILY
Qty: 0 | Refills: 0 | Status: DISCONTINUED | OUTPATIENT
Start: 2018-04-18 | End: 2018-04-24

## 2018-04-18 RX ORDER — FINASTERIDE 5 MG/1
5 TABLET, FILM COATED ORAL DAILY
Qty: 0 | Refills: 0 | Status: DISCONTINUED | OUTPATIENT
Start: 2018-04-18 | End: 2018-04-24

## 2018-04-18 NOTE — ED ADULT NURSE NOTE - OBJECTIVE STATEMENT
pt states that he was sent to [facility] from [facility] for ERCP. pt states that he has been jaundice for four days

## 2018-04-18 NOTE — ED ADULT NURSE NOTE - CHIEF COMPLAINT QUOTE
BIBA, transfer from [facility] for ERCP procedure. pt jaundice in color, denies n/v/d, denies pain. resp even unlabored, MAEx4, neuro intact. moved to main ED for admission

## 2018-04-18 NOTE — ED PROVIDER NOTE - OBJECTIVE STATEMENT
89 y/o male with PMHx of CAD presents to the ED by Dr. Carrasco at [facility] for evaluation of jaundice. Pt states a few days ago his son mentioned to him that he looked "very yellow" which prompted pt to go see his doctor yesterday. A CT scan from his doctors office showed a 2cm blockage in his liver and was sent to ED for a possible liver procedure. Denies ND, abd pain. On Plavix, Cardizem, Finasteride, Vasotec, Lipitor.

## 2018-04-18 NOTE — H&P ADULT - HISTORY OF PRESENT ILLNESS
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 Pt reports that he has been in his usual state of health until his son noticed that he was not "looking right". Pt went to PMD who then sent him to [facility]. CT abdomen showed 2cm lesion in pancreas. Denies any unintentional weight loss, loss of appetite, recent infection.     In the ED, Tbili was 17.

## 2018-04-18 NOTE — ED ADULT NURSE NOTE - OBJECTIVE STATEMENT
Pt sent in by PMD s/p CT scan showing "liver blockage" - pt denies any symptoms.  Pt is jaundiced to entire body.

## 2018-04-18 NOTE — ED PROVIDER NOTE - OBJECTIVE STATEMENT
89 y/o M pt with hx of CAD transferred from [facility] to ED. Pt has been jaundiced for 4 days. Pt went to PMD, had CT and found mass blocking gall bladder/liver. Pt went to [facility] today and was told operation could not be done. Denies pain, chills, fever, SOB, CP

## 2018-04-18 NOTE — CONSULT NOTE ADULT - ASSESSMENT
89 yo male with painless jaundice and pancreatic lesion. Patient would benefit from transfer to facility for possible EUS/ERCP. Please call with questions - thanks!

## 2018-04-18 NOTE — ED PROVIDER NOTE - CONSTITUTIONAL, MLM
normal... Jaundice appearing, well nourished, awake, alert, oriented to person, place, time/situation and in no apparent distress.

## 2018-04-18 NOTE — ED ADULT TRIAGE NOTE - CHIEF COMPLAINT QUOTE
BIBA, transfer from [facility] for ERCP procedure. pt jaundice in color, denies n/v/d, denies pain. resp even unlabored, MAEx4, neuro intact. moved to main ED for admission

## 2018-04-18 NOTE — CONSULT NOTE ADULT - SUBJECTIVE AND OBJECTIVE BOX
Patient is a 89y old  Male who presents with a chief complaint of painless jaundice.    HPI: Patient has noted several weeks of painless jaundice and pruritis. No fevers, chills. Patient went to walk in clinic - CT shows 2 cm lesion in pancreas. No weight loss. Patient is s/p TAVR and multiple cardiac stents.       PAST MEDICAL & SURGICAL HISTORY:  CAD (coronary artery disease)  No significant past surgical history      MEDICATIONS  (STANDING):    MEDICATIONS  (PRN):      Allergies    No Known Allergies    Intolerances        SOCIAL HISTORY:    FAMILY HISTORY:      REVIEW OF SYSTEMS:    CONSTITUTIONAL: No weakness, fevers or chills  EYES/ENT: No visual changes;  No vertigo or throat pain   NECK: No pain or stiffness  RESPIRATORY: No cough, wheezing, hemoptysis; No shortness of breath  CARDIOVASCULAR: No chest pain or palpitations  GASTROINTESTINAL: No abdominal or epigastric pain. No nausea, vomiting, or hematemesis; No diarrhea or constipation. No melena or hematochezia.  GENITOURINARY: No dysuria, frequency or hematuria  NEUROLOGICAL: No numbness or weakness  SKIN: No itching, burning, rashes, or lesions   PSYCH: Normal mood and affect  All other review of systems is negative unless indicated above.    Vital Signs Last 24 Hrs  T(C): 36.4 (18 Apr 2018 10:06), Max: 36.4 (18 Apr 2018 10:06)  T(F): 97.5 (18 Apr 2018 10:06), Max: 97.5 (18 Apr 2018 10:06)  HR: 75 (18 Apr 2018 10:06) (75 - 75)  BP: 144/79 (18 Apr 2018 10:06) (144/79 - 144/79)  BP(mean): --  RR: 18 (18 Apr 2018 10:06) (18 - 18)  SpO2: 100% (18 Apr 2018 10:06) (100% - 100%)    PHYSICAL EXAM:    Constitutional: jaundiced male  HEENT: MMM  Neck: No LAD  Respiratory: CTAB  Cardiovascular: S1 and S2, RRR, no M/G/R  Gastrointestinal: BS+, soft, NT/ND  Extremities: No peripheral edema    LABS:                        13.3   5.99  )-----------( 280      ( 18 Apr 2018 10:17 )             36.3     04-18    138  |  103  |  18  ----------------------------<  128<H>  4.1   |  27  |  1.00    Ca    9.1      18 Apr 2018 11:16    TPro  6.4  /  Alb  2.7<L>  /  TBili  17.1<H>  /  DBili  x   /  AST  177<H>  /  ALT  206<H>  /  AlkPhos  773<H>  04-18    PT/INR - ( 18 Apr 2018 11:16 )   PT: 19.2 sec;   INR: 1.76 ratio         PTT - ( 18 Apr 2018 11:16 )  PTT:41.6 sec  LIVER FUNCTIONS - ( 18 Apr 2018 11:16 )  Alb: 2.7 g/dL / Pro: 6.4 gm/dL / ALK PHOS: 773 U/L / ALT: 206 U/L / AST: 177 U/L / GGT: x             RADIOLOGY & ADDITIONAL STUDIES:

## 2018-04-18 NOTE — CONSULT NOTE ADULT - ASSESSMENT
Patient with obstructive jaundice, CT report not available    1. Get MRI/MRCP  2. Ca 19-9, CEA level  3. Hold plavix  4. Cardiology consultation  5. Possible EUS/ERCP on monday

## 2018-04-18 NOTE — ED ADULT TRIAGE NOTE - CHIEF COMPLAINT QUOTE
pt sent to Ed by Dr. jean at [facility] for possible liver procedure, pt denies pain or symptoms, pt refused wheelchair

## 2018-04-18 NOTE — CONSULT NOTE ADULT - SUBJECTIVE AND OBJECTIVE BOX
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 Pt reports that he has been in his usual state of health until his son noticed that he was not "looking right". Pt went to PMD who then sent him to [facility]. CT abdomen showed 2cm lesion in pancreas. Denies any unintentional weight loss, loss of appetite, recent infection.     In the ED, Tbili was 17.       PAST MEDICAL & SURGICAL HISTORY:  BPH (benign prostatic hyperplasia)  High cholesterol  CAD (coronary artery disease)  No significant past surgical history      ROS:  No Heartburn, regurgitation, dysphagia, odynophagia.  No dyspepsia  No abdominal pain.    No Nausea, vomiting.  No Bleeding.  No hematemesis.   No diarrhea.    No hematochezia  No weight loss, anorexia.  No edema.      MEDICATIONS  (STANDING):  atorvastatin 20 milliGRAM(s) Oral at bedtime  diltiazem    Tablet 120 milliGRAM(s) Oral daily  dronedarone 400 milliGRAM(s) Oral with dinner  enalapril 5 milliGRAM(s) Oral two times a day  finasteride 5 milliGRAM(s) Oral daily  folic acid 1 milliGRAM(s) Oral daily  tamsulosin 0.4 milliGRAM(s) Oral daily    MEDICATIONS  (PRN):      Allergies    No Known Allergies    Intolerances        SOCIAL HISTORY:Ex smoker. Drinks wine. No drugs    ENDOSCOPIC/GI HISTORY:    FAMILY HISTORY:  No pertinent family history in first degree relatives      Vital Signs Last 24 Hrs  T(C): 36.7 (18 Apr 2018 16:53), Max: 36.8 (18 Apr 2018 14:45)  T(F): 98 (18 Apr 2018 16:53), Max: 98.2 (18 Apr 2018 14:45)  HR: 73 (18 Apr 2018 16:53) (70 - 75)  BP: 140/80 (18 Apr 2018 16:53) (140/80 - 157/85)  BP(mean): 100 (18 Apr 2018 16:53) (100 - 100)  RR: 12 (18 Apr 2018 16:53) (12 - 18)  SpO2: 98% (18 Apr 2018 16:53) (95% - 100%)    PHYSICAL EXAM:    GENERAL: NAD, well-groomed, well-developed. Deep icterus  HEAD:  Atraumatic, Normocephalic  EYES: EOMI, PERRLA, conjunctiva and sclera icterus  ENMT: No tonsillar erythema, exudates, or enlargement; Moist mucous membranes, Good dentition, No lesions  NECK: Supple, No JVD, Normal thyroid  CHEST/LUNG: Clear to percussion bilaterally; No rales, rhonchi, wheezing, or rubs  HEART: Regular rate and rhythm; No murmurs, rubs, or gallops  ABDOMEN: Soft, Nontender, Nondistended; Bowel sounds present  EXTREMITIES:  2+ Peripheral Pulses, No clubbing, cyanosis, or edema  LYMPH: No lymphadenopathy noted  SKIN: No rashes or lesions      LABS:                        13.3   5.99  )-----------( 280      ( 18 Apr 2018 10:17 )             36.3     04-18    138  |  103  |  18  ----------------------------<  128<H>  4.1   |  27  |  1.00    Ca    9.1      18 Apr 2018 11:16    TPro  6.4  /  Alb  2.7<L>  /  TBili  17.1<H>  /  DBili  x   /  AST  177<H>  /  ALT  206<H>  /  AlkPhos  773<H>  04-18    PT/INR - ( 18 Apr 2018 11:16 )   PT: 19.2 sec;   INR: 1.76 ratio         PTT - ( 18 Apr 2018 11:16 )  PTT:41.6 sec       LIVER FUNCTIONS - ( 18 Apr 2018 11:16 )  Alb: 2.7 g/dL / Pro: 6.4 gm/dL / ALK PHOS: 773 U/L / ALT: 206 U/L / AST: 177 U/L / GGT: x               RADIOLOGY & ADDITIONAL STUDIES: HPI:  89 yo M with h/o HTN, CAD, TAVR, anemia, BPH was transferred from [facility] for newly found pancreatic mass necessitating EUS/ERCP. Pt reports that he has been in his usual state of health until his son noticed that he was not "looking right". Pt went to PMD who then sent him to [facility]. CT abdomen showed 2cm lesion in pancreas. Denies any unintentional weight loss, loss of appetite, recent infection.     In the ED, Tbili was 17.       PAST MEDICAL & SURGICAL HISTORY:  BPH (benign prostatic hyperplasia)  High cholesterol  CAD (coronary artery disease)  No significant past surgical history      ROS:  No Heartburn, regurgitation, dysphagia, odynophagia.  No dyspepsia  No abdominal pain.    No Nausea, vomiting.  No Bleeding.  No hematemesis.   No diarrhea.    No hematochezia  No weight loss, anorexia.  No edema.      MEDICATIONS  (STANDING):  atorvastatin 20 milliGRAM(s) Oral at bedtime  diltiazem    Tablet 120 milliGRAM(s) Oral daily  dronedarone 400 milliGRAM(s) Oral with dinner  enalapril 5 milliGRAM(s) Oral two times a day  finasteride 5 milliGRAM(s) Oral daily  folic acid 1 milliGRAM(s) Oral daily  tamsulosin 0.4 milliGRAM(s) Oral daily    MEDICATIONS  (PRN):      Allergies    No Known Allergies    Intolerances        SOCIAL HISTORY:Ex smoker. Drinks wine. No drugs    ENDOSCOPIC/GI HISTORY:Colonoscopy about 18 years ago    FAMILY HISTORY:  No pertinent family history in first degree relatives      Vital Signs Last 24 Hrs  T(C): 36.7 (18 Apr 2018 16:53), Max: 36.8 (18 Apr 2018 14:45)  T(F): 98 (18 Apr 2018 16:53), Max: 98.2 (18 Apr 2018 14:45)  HR: 73 (18 Apr 2018 16:53) (70 - 75)  BP: 140/80 (18 Apr 2018 16:53) (140/80 - 157/85)  BP(mean): 100 (18 Apr 2018 16:53) (100 - 100)  RR: 12 (18 Apr 2018 16:53) (12 - 18)  SpO2: 98% (18 Apr 2018 16:53) (95% - 100%)    PHYSICAL EXAM:    GENERAL: NAD, well-groomed, well-developed. Deep icterus  HEAD:  Atraumatic, Normocephalic  EYES: EOMI, PERRLA, conjunctiva and sclera icterus  ENMT: No tonsillar erythema, exudates, or enlargement; Moist mucous membranes, Good dentition, No lesions  NECK: Supple, No JVD, Normal thyroid  CHEST/LUNG: Clear to percussion bilaterally; No rales, rhonchi, wheezing, or rubs  HEART: Regular rate and rhythm; No murmurs, rubs, or gallops  ABDOMEN: Soft, Nontender, Nondistended; Bowel sounds present  EXTREMITIES:  2+ Peripheral Pulses, No clubbing, cyanosis, or edema  LYMPH: No lymphadenopathy noted  SKIN: No rashes or lesions      LABS:                        13.3   5.99  )-----------( 280      ( 18 Apr 2018 10:17 )             36.3     04-18    138  |  103  |  18  ----------------------------<  128<H>  4.1   |  27  |  1.00    Ca    9.1      18 Apr 2018 11:16    TPro  6.4  /  Alb  2.7<L>  /  TBili  17.1<H>  /  DBili  x   /  AST  177<H>  /  ALT  206<H>  /  AlkPhos  773<H>  04-18    PT/INR - ( 18 Apr 2018 11:16 )   PT: 19.2 sec;   INR: 1.76 ratio         PTT - ( 18 Apr 2018 11:16 )  PTT:41.6 sec       LIVER FUNCTIONS - ( 18 Apr 2018 11:16 )  Alb: 2.7 g/dL / Pro: 6.4 gm/dL / ALK PHOS: 773 U/L / ALT: 206 U/L / AST: 177 U/L / GGT: x               RADIOLOGY & ADDITIONAL STUDIES:

## 2018-04-18 NOTE — H&P ADULT - RS GEN PE MLT RESP DETAILS PC
no chest wall tenderness/clear to auscultation bilaterally/respirations non-labored/normal/airway patent/breath sounds equal/good air movement

## 2018-04-18 NOTE — H&P ADULT - NEGATIVE GASTROINTESTINAL SYMPTOMS
no melena/no nausea/no vomiting/no diarrhea/no hematochezia/no hiccoughs/no constipation/no change in bowel habits/no abdominal pain

## 2018-04-18 NOTE — ED ADULT NURSE REASSESSMENT NOTE - NS ED NURSE REASSESS COMMENT FT1
Pt and family requesting to speak with MD  regarding specific questions for procedure, MD Pavon made aware

## 2018-04-19 DIAGNOSIS — K83.1 OBSTRUCTION OF BILE DUCT: ICD-10-CM

## 2018-04-19 LAB
ALBUMIN SERPL ELPH-MCNC: 3.2 G/DL — LOW (ref 3.3–5.2)
ALP SERPL-CCNC: 682 U/L — HIGH (ref 40–120)
ALT FLD-CCNC: 155 U/L — HIGH
ANION GAP SERPL CALC-SCNC: 13 MMOL/L — SIGNIFICANT CHANGE UP (ref 5–17)
AST SERPL-CCNC: 152 U/L — HIGH
BASOPHILS # BLD AUTO: 0 K/UL — SIGNIFICANT CHANGE UP (ref 0–0.2)
BASOPHILS NFR BLD AUTO: 0.2 % — SIGNIFICANT CHANGE UP (ref 0–2)
BILIRUB SERPL-MCNC: 17.2 MG/DL — HIGH (ref 0.4–2)
BUN SERPL-MCNC: 21 MG/DL — HIGH (ref 8–20)
CALCIUM SERPL-MCNC: 8.9 MG/DL — SIGNIFICANT CHANGE UP (ref 8.6–10.2)
CHLORIDE SERPL-SCNC: 101 MMOL/L — SIGNIFICANT CHANGE UP (ref 98–107)
CO2 SERPL-SCNC: 23 MMOL/L — SIGNIFICANT CHANGE UP (ref 22–29)
CREAT SERPL-MCNC: 0.26 MG/DL — LOW (ref 0.5–1.3)
EOSINOPHIL # BLD AUTO: 0.2 K/UL — SIGNIFICANT CHANGE UP (ref 0–0.5)
EOSINOPHIL NFR BLD AUTO: 2.8 % — SIGNIFICANT CHANGE UP (ref 0–5)
GLUCOSE SERPL-MCNC: 112 MG/DL — SIGNIFICANT CHANGE UP (ref 70–115)
HCT VFR BLD CALC: 35.3 % — LOW (ref 42–52)
HGB BLD-MCNC: 12.3 G/DL — LOW (ref 14–18)
INR BLD: 2.38 RATIO — HIGH (ref 0.88–1.16)
LYMPHOCYTES # BLD AUTO: 1.2 K/UL — SIGNIFICANT CHANGE UP (ref 1–4.8)
LYMPHOCYTES # BLD AUTO: 18 % — LOW (ref 20–55)
MAGNESIUM SERPL-MCNC: 2.2 MG/DL — SIGNIFICANT CHANGE UP (ref 1.6–2.6)
MCHC RBC-ENTMCNC: 33.7 PG — HIGH (ref 27–31)
MCHC RBC-ENTMCNC: 34.8 G/DL — SIGNIFICANT CHANGE UP (ref 32–36)
MCV RBC AUTO: 96.7 FL — HIGH (ref 80–94)
MONOCYTES # BLD AUTO: 1 K/UL — HIGH (ref 0–0.8)
MONOCYTES NFR BLD AUTO: 15.4 % — HIGH (ref 3–10)
NEUTROPHILS # BLD AUTO: 3.4 K/UL — SIGNIFICANT CHANGE UP (ref 1.8–8)
NEUTROPHILS NFR BLD AUTO: 51.5 % — SIGNIFICANT CHANGE UP (ref 37–73)
PHOSPHATE SERPL-MCNC: 3.6 MG/DL — SIGNIFICANT CHANGE UP (ref 2.4–4.7)
PLATELET # BLD AUTO: 230 K/UL — SIGNIFICANT CHANGE UP (ref 150–400)
POTASSIUM SERPL-MCNC: 4.2 MMOL/L — SIGNIFICANT CHANGE UP (ref 3.5–5.3)
POTASSIUM SERPL-SCNC: 4.2 MMOL/L — SIGNIFICANT CHANGE UP (ref 3.5–5.3)
PROT SERPL-MCNC: 6 G/DL — LOW (ref 6.6–8.7)
PROTHROM AB SERPL-ACNC: 26.7 SEC — HIGH (ref 9.8–12.7)
RBC # BLD: 3.65 M/UL — LOW (ref 4.6–6.2)
RBC # FLD: 17.9 % — HIGH (ref 11–15.6)
SODIUM SERPL-SCNC: 137 MMOL/L — SIGNIFICANT CHANGE UP (ref 135–145)
WBC # BLD: 6.5 K/UL — SIGNIFICANT CHANGE UP (ref 4.8–10.8)
WBC # FLD AUTO: 6.5 K/UL — SIGNIFICANT CHANGE UP (ref 4.8–10.8)

## 2018-04-19 PROCEDURE — 99233 SBSQ HOSP IP/OBS HIGH 50: CPT

## 2018-04-19 PROCEDURE — 99223 1ST HOSP IP/OBS HIGH 75: CPT

## 2018-04-19 PROCEDURE — 93010 ELECTROCARDIOGRAM REPORT: CPT

## 2018-04-19 RX ORDER — PHYTONADIONE (VIT K1) 5 MG
5 TABLET ORAL DAILY
Qty: 0 | Refills: 0 | Status: COMPLETED | OUTPATIENT
Start: 2018-04-19 | End: 2018-04-21

## 2018-04-19 RX ADMIN — Medication 1 MILLIGRAM(S): at 12:39

## 2018-04-19 RX ADMIN — Medication 5 MILLIGRAM(S): at 18:00

## 2018-04-19 RX ADMIN — Medication 5 MILLIGRAM(S): at 08:18

## 2018-04-19 RX ADMIN — DRONEDARONE 400 MILLIGRAM(S): 400 TABLET, FILM COATED ORAL at 18:00

## 2018-04-19 RX ADMIN — Medication 2.5 MILLIGRAM(S): at 08:19

## 2018-04-19 RX ADMIN — FINASTERIDE 5 MILLIGRAM(S): 5 TABLET, FILM COATED ORAL at 12:39

## 2018-04-19 RX ADMIN — TAMSULOSIN HYDROCHLORIDE 0.4 MILLIGRAM(S): 0.4 CAPSULE ORAL at 12:39

## 2018-04-19 RX ADMIN — ATORVASTATIN CALCIUM 20 MILLIGRAM(S): 80 TABLET, FILM COATED ORAL at 21:02

## 2018-04-19 NOTE — CONSULT NOTE ADULT - SUBJECTIVE AND OBJECTIVE BOX
CARDIOLOGY CONSULTATION NOTE   Consult requested by:  Dr. akers    Reason for Consultation:  Preoperative cardiovascular examination     History obtained by: Patient, family and medical record     obtained: No    Chief complaint:      Patient is a 89y old  Male who presents with a chief complaint of Transfer from [place] for ERCP (18 Apr 2018 16:53)      HPI:  89M hx HTN, CAD, PCI, stents several years ago, TAVR 2 years ago with resultant aortic regurgitation which is treated medically with baseline   PAZ unchanged, PAF on Multaq, Cardizem (not on AC) who was foubnd to have painless juandice and new pancreatic lesion, s/p ERCP showed Severe intra-and extrahepatic biliary dilation and severe pancreatic duct dilation, likely produced by an approximately 15 mm mass within the superior aspect of the pancreatic head, seen by GI, schedule for ERCP.  necessitating EUS/ERCP and transferred from  [facility] for newly found pancreatic mass necessitating EUS/ERCP.   Denied fever, chills, orthopnea, PND, edema, CP, palpitation, nausea, vomiting, hematuria, melena syncope, near syncope.  (PCP [facility]  which I called a, office is closed)      REVIEW OF SYMPTOMS:   Constitutional: Denied: fever, chills, weight loss or gain  Eyes: Denied: reddened ayes, eye discharge, eye pain  ENMT: Denied: ear pain, nasal discharge, mouth pain, throat pain or swelling  Cardiovascular: Denied: chest pain,  Chest pressure, palpitation, arrhythmia, irregular or fast heart beats, edema  Respiratory: Denied: cough, phlegm production, wheezes, dyspnea, orthopnea, PND,   GI: Denied: Abdominal pain, nausea, vomiting, diarrhea, constipation, melena, rectal bleed  : Denied: hematuria, frequency  Musculoskeletal: Denied: Muscle aches, weakness, pain  Hematology/lymp: Denied: Bleeding disorder, anemia, blood clotting  Neuro: Denied: Headache, light headedness, dizziness, numbness, aphasia, dysarthria, seizure,  syncope, near syncope  Psych: Denied: depression, anxiety  Integumentary/skin: Denied: rash, bruises, ecchymosis, itching  Allergy/Immunology: denied environmental or drug allergies    ALL OTHER REVIEW OF SYSTEMS ARE NEGATIVE.    MEDICATIONS  (STANDING):  atorvastatin 20 milliGRAM(s) Oral at bedtime  diltiazem    Tablet 120 milliGRAM(s) Oral daily  dronedarone 400 milliGRAM(s) Oral with dinner  enalapril 5 milliGRAM(s) Oral two times a day  finasteride 5 milliGRAM(s) Oral daily  folic acid 1 milliGRAM(s) Oral daily  phytonadione   Solution 5 milliGRAM(s) Oral daily  tamsulosin 0.4 milliGRAM(s) Oral daily    MEDICATIONS  (PRN):        PAST MEDICAL & SURGICAL HISTORY:  BPH (benign prostatic hyperplasia)  High cholesterol  CAD (coronary artery disease)  No significant past surgical history      FAMILY HISTORY:  No pertinent family history in first degree relatives      SOCIAL HISTORY:    CIGARETTES:  No    ALCOHOL: No    DRUGS: No    Vital Signs Last 24 Hrs  T(C): 35.6 (19 Apr 2018 11:35), Max: 36.7 (18 Apr 2018 16:53)  T(F): 96 (19 Apr 2018 11:35), Max: 98.1 (19 Apr 2018 08:50)  HR: 63 (19 Apr 2018 11:35) (63 - 73)  BP: 143/66 (19 Apr 2018 11:35) (137/64 - 143/66)  BP(mean): 100 (18 Apr 2018 16:53) (100 - 100)  RR: 21 (19 Apr 2018 11:35) (12 - 21)  SpO2: 96% (19 Apr 2018 11:35) (96% - 98%)    PHYSICAL EXAM:      Constitutional: No fever, chills, NAD, Comfortable, Juandiced    Eyes: Not reddened, no discharge, juandice    ENMT: No discharge, No pain    Neck: No JVD, No Bruit    Back: No CVA tenderness    Respiratory: Clear to auscultation bilaterally    Cardiovascular: RRR, Normal S1-2, No S3-, No friction rub 2/6 MISA at RUSB    Gastrointestinal: Soft, NT/ND. BS+, No organomegaly    Extremities: No edema    Vascular: Distal pulses intact    Neurological: Alert, awake, oriented, able to move extremities, speach is clear    Skin: No ecchymosis, no rash    Musculoskeletal: Non tender, no weaknss    Psychiatric: Aproppriate mood, no anxiety        INTERPRETATION OF TELEMETRY:    ECG: Pending    ( Prior ECG 11/10/2017: Sinus rhythm with 1st degree A-V block, Left axis deviation, Left bundle branch block)    I&O's Detail      LABS:                        12.3   6.5   )-----------( 230      ( 19 Apr 2018 07:15 )             35.3     04-19    137  |  101  |  21.0<H>  ----------------------------<  112  4.2   |  23.0  |  0.26<L>    Ca    8.9      19 Apr 2018 07:15  Phos  3.6     04-19  Mg     2.2     04-19    TPro  6.0<L>  /  Alb  3.2<L>  /  TBili  17.2<H>  /  DBili  x   /  AST  152<H>  /  ALT  155<H>  /  AlkPhos  682<H>  04-19        PT/INR - ( 19 Apr 2018 11:38 )   PT: 26.7 sec;   INR: 2.38 ratio         PTT - ( 18 Apr 2018 11:16 )  PTT:41.6 sec    I&O's Summary

## 2018-04-19 NOTE — PROGRESS NOTE ADULT - SUBJECTIVE AND OBJECTIVE BOX
Pt seen and examined. MRCP results noted. Pt with a 15 mm. pancreatic head mass with double duct sign ( marked biliary and pancreatic ductal dilatation). Pt. has no c/o abdominal pain and his last Plavix dose was yesterday therefore will need to wait until Monday for pt. to have EUS/ ERCP and bilary stent placement.     REVIEW OF SYSTEMS:  Constitutional: No fever, weight loss or fatigue  Cardiovascular: No chest pain, palpitations, dizziness or leg swelling  Gastrointestinal: No abdominal or epigastric pain. No nausea, vomiting or hematemesis; No diarrhea or constipation. No melena or hematochezia. Pt is jaundiced.  Skin: No itching, burning, rashes or lesions       MEDICATIONS:  MEDICATIONS  (STANDING):  atorvastatin 20 milliGRAM(s) Oral at bedtime  diltiazem    Tablet 120 milliGRAM(s) Oral daily  dronedarone 400 milliGRAM(s) Oral with dinner  enalapril 5 milliGRAM(s) Oral two times a day  finasteride 5 milliGRAM(s) Oral daily  folic acid 1 milliGRAM(s) Oral daily  tamsulosin 0.4 milliGRAM(s) Oral daily    MEDICATIONS  (PRN):      Allergies    No Known Allergies    Intolerances        Vital Signs Last 24 Hrs  T(C): 36.7 (19 Apr 2018 00:05), Max: 36.8 (18 Apr 2018 14:45)  T(F): 98 (19 Apr 2018 00:05), Max: 98.2 (18 Apr 2018 14:45)  HR: 67 (19 Apr 2018 00:05) (67 - 75)  BP: 137/64 (19 Apr 2018 00:05) (137/64 - 157/85)  BP(mean): 100 (18 Apr 2018 16:53) (100 - 100)  RR: 18 (19 Apr 2018 00:05) (12 - 18)  SpO2: 98% (19 Apr 2018 00:05) (95% - 100%)      PHYSICAL EXAM:    General: Well developed; well nourished; in no acute distress  HEENT: MMM, conjunctiva pink and sclera deeply icteric.  Lungs: clear to auscultation and percussion.  Cor: RRR S1, S2 only.  Gastrointestinal: Abdomen: Soft non-tender non-distended; Normal bowel sounds; No hepatosplenomegaly  Extremities: no cyanosis, clubbing or edema.  Skin: Warm and dry. No obvious rash. Deeply icteric.  Neuro: Pt. a + o x 3, no tremulousness or asterixsis    LABS:      CBC Full  -  ( 19 Apr 2018 07:15 )  WBC Count : 6.5 K/uL  Hemoglobin : 12.3 g/dL  Hematocrit : 35.3 %  Platelet Count - Automated : 230 K/uL  Mean Cell Volume : 96.7 fl  Mean Cell Hemoglobin : 33.7 pg  Mean Cell Hemoglobin Concentration : 34.8 g/dL  Auto Neutrophil # : 3.4 K/uL  Auto Lymphocyte # : 1.2 K/uL  Auto Monocyte # : 1.0 K/uL  Auto Eosinophil # : 0.2 K/uL  Auto Basophil # : 0.0 K/uL  Auto Neutrophil % : 51.5 %  Auto Lymphocyte % : 18.0 %  Auto Monocyte % : 15.4 %  Auto Eosinophil % : 2.8 %  Auto Basophil % : 0.2 %    04-18    138  |  103  |  18  ----------------------------<  128<H>  4.1   |  27  |  1.00    Ca    9.1      18 Apr 2018 11:16    TPro  6.4  /  Alb  2.7<L>  /  TBili  17.1<H>  /  DBili  x   /  AST  177<H>  /  ALT  206<H>  /  AlkPhos  773<H>  04-18    PT/INR - ( 18 Apr 2018 11:16 )   PT: 19.2 sec;   INR: 1.76 ratio         PTT - ( 18 Apr 2018 11:16 )  PTT:41.6 sec                  RADIOLOGY & ADDITIONAL STUDIES (The following images were personally reviewed): MRCP results noted above.

## 2018-04-19 NOTE — PROGRESS NOTE ADULT - SUBJECTIVE AND OBJECTIVE BOX
Internal Medicine Hospitalist - Dr. No EUGENE    [id number]    89y      Male    Patient is a 89y old  Male who presents with a chief complaint of Transfer from [place] for ERCP (18 Apr 2018 16:53)    INTERVAL HPI/ OVERNIGHT EVENTS: Patient is seen and examined, denied abd. pain, nausea and vomiting.    REVIEW OF SYSTEMS:    Denied fever, chills, abd. pain, nausea, vomiting, chest pain, SOB, headache, dizziness    PHYSICAL EXAM:    Vital Signs Last 24 Hrs  T(C): 35.6 (19 Apr 2018 11:35), Max: 36.8 (18 Apr 2018 14:45)  T(F): 96 (19 Apr 2018 11:35), Max: 98.2 (18 Apr 2018 14:45)  HR: 63 (19 Apr 2018 11:35) (63 - 75)  BP: 143/66 (19 Apr 2018 11:35) (137/64 - 157/85)  BP(mean): 100 (18 Apr 2018 16:53) (100 - 100)  RR: 21 (19 Apr 2018 11:35) (12 - 21)  SpO2: 96% (19 Apr 2018 11:35) (95% - 100%)    GENERAL: Jaundice  HEENT: EOMI  Neck: supple  CHEST/LUNG: CTA b/l   HEART: S1S2+ audible  ABDOMEN: Soft, Nontender, Nondistended; Bowel sounds present  EXTREMITIES:  no edema  CNS: AAO X 3  Psychiatry: normal mood    LABS:                        12.3   6.5   )-----------( 230      ( 19 Apr 2018 07:15 )             35.3     04-19    137  |  101  |  21.0<H>  ----------------------------<  112  4.2   |  23.0  |  0.26<L>    Ca    8.9      19 Apr 2018 07:15  Phos  3.6     04-19  Mg     2.2     04-19    TPro  6.0<L>  /  Alb  3.2<L>  /  TBili  17.2<H>  /  DBili  x   /  AST  152<H>  /  ALT  155<H>  /  AlkPhos  682<H>  04-19    PT/INR - ( 19 Apr 2018 11:38 )   PT: 26.7 sec;   INR: 2.38 ratio         PTT - ( 18 Apr 2018 11:16 )  PTT:41.6 sec        MEDICATIONS  (STANDING):  atorvastatin 20 milliGRAM(s) Oral at bedtime  diltiazem    Tablet 120 milliGRAM(s) Oral daily  dronedarone 400 milliGRAM(s) Oral with dinner  enalapril 5 milliGRAM(s) Oral two times a day  finasteride 5 milliGRAM(s) Oral daily  folic acid 1 milliGRAM(s) Oral daily  phytonadione   Solution 5 milliGRAM(s) Oral daily  tamsulosin 0.4 milliGRAM(s) Oral daily    MEDICATIONS  (PRN):      RADIOLOGY & ADDITIONAL TEST    < from: MR MRCP w/wo IV Cont (04.18.18 @ 20:19) >  IMPRESSION:           Severe intra-and extrahepatic biliary dilation and severe pancreatic   duct   dilation, likely produced by an approximately 15 mm mass within the   superior   aspect of the pancreatic head.    < end of copied text >    < from: US Abdomen Limited (04.18.18 @ 19:29) >  IMPRESSION:     Severe intrahepatic biliary ductal dilatation and severe dilatation of   the CBD.  Gallbladder sludge and mild gallbladder wall thickening. Further   evaluation with abdominal CT scan with contrast is recommended.    < end of copied text >

## 2018-04-19 NOTE — CONSULT NOTE ADULT - ASSESSMENT
89M hx HTN, CAD, PCI, stents several years ago, TAVR 2 years ago with resultant aortic regurgitation which is treated medically with baseline   PAZ unchanged, PAF on Multaq, Cardizem (not on AC) who was foubnd to have painless juandice and new pancreatic lesion, s/p ERCP showed Severe intra-and extrahepatic biliary dilation and severe pancreatic duct dilation, likely produced by an approximately 15 mm mass within the superior aspect of the pancreatic head, seen by GI, schedule for ERCP.  necessitating EUS/ERCP and transferred from  [facility] for newly found pancreatic mass necessitating EUS/ERCP.   Denied fever, chills, orthopnea, PND, edema, CP, palpitation, nausea, vomiting, hematuria, melena syncope, near syncope.  (PCP [facility]  which I called a, office is closed)      Preoperative cardiovascular examination  Denied CP, reasonable exercise tolerance  Will obtain EKG and  Echo if EKG unchanged from previous and if EF is normal; AVR is normal function pt is optimized for th planned procedure from cardiac standpoint    Jaundice due to pancreatic mass   appreciate GI input - need ERCP  Holding plavix for 5 days  c/w vitamin K,  f/u INR     CAD s/p remote stent   Denied anginal symtpoms    S/p TAVR h/o  residual AR   Echo planned    PAF   Cont telemetry  c/w Multaq, Cardizem   not on AC    HTN: Monitor BP

## 2018-04-19 NOTE — PROGRESS NOTE ADULT - PROBLEM SELECTOR PLAN 1
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 Plan as outlined above. Vitamin K ordered to reverse coagulopathy. Hold all anti-platelet medications. Repeat labs ordered for tomorrow AM.

## 2018-04-20 DIAGNOSIS — R17 UNSPECIFIED JAUNDICE: ICD-10-CM

## 2018-04-20 DIAGNOSIS — E80.7 DISORDER OF BILIRUBIN METABOLISM, UNSPECIFIED: ICD-10-CM

## 2018-04-20 DIAGNOSIS — I25.10 ATHEROSCLEROTIC HEART DISEASE OF NATIVE CORONARY ARTERY WITHOUT ANGINA PECTORIS: ICD-10-CM

## 2018-04-20 DIAGNOSIS — K86.9 DISEASE OF PANCREAS, UNSPECIFIED: ICD-10-CM

## 2018-04-20 DIAGNOSIS — Z79.82 LONG TERM (CURRENT) USE OF ASPIRIN: ICD-10-CM

## 2018-04-20 DIAGNOSIS — R94.31 ABNORMAL ELECTROCARDIOGRAM [ECG] [EKG]: ICD-10-CM

## 2018-04-20 LAB
ALBUMIN SERPL ELPH-MCNC: 3.2 G/DL — LOW (ref 3.3–5.2)
ALP SERPL-CCNC: 601 U/L — HIGH (ref 40–120)
ALT FLD-CCNC: 140 U/L — HIGH
ANION GAP SERPL CALC-SCNC: 11 MMOL/L — SIGNIFICANT CHANGE UP (ref 5–17)
ANISOCYTOSIS BLD QL: SLIGHT — SIGNIFICANT CHANGE UP
AST SERPL-CCNC: 137 U/L — HIGH
BASOPHILS NFR BLD AUTO: 1 % — SIGNIFICANT CHANGE UP (ref 0–2)
BILIRUB SERPL-MCNC: 17.6 MG/DL — HIGH (ref 0.4–2)
BUN SERPL-MCNC: 24 MG/DL — HIGH (ref 8–20)
CALCIUM SERPL-MCNC: 8.6 MG/DL — SIGNIFICANT CHANGE UP (ref 8.6–10.2)
CANCER AG19-9 SERPL-ACNC: 791.1 U/ML — HIGH
CEA SERPL-MCNC: 5.3 NG/ML — HIGH (ref 0–3.8)
CHLORIDE SERPL-SCNC: 102 MMOL/L — SIGNIFICANT CHANGE UP (ref 98–107)
CO2 SERPL-SCNC: 22 MMOL/L — SIGNIFICANT CHANGE UP (ref 22–29)
CREAT SERPL-MCNC: 0.24 MG/DL — LOW (ref 0.5–1.3)
DACRYOCYTES BLD QL SMEAR: SLIGHT — SIGNIFICANT CHANGE UP
EOSINOPHIL # BLD AUTO: 0.2 K/UL — SIGNIFICANT CHANGE UP (ref 0–0.5)
EOSINOPHIL NFR BLD AUTO: 2 % — SIGNIFICANT CHANGE UP (ref 0–6)
GLUCOSE SERPL-MCNC: 115 MG/DL — SIGNIFICANT CHANGE UP (ref 70–115)
HCT VFR BLD CALC: 33.7 % — LOW (ref 42–52)
HGB BLD-MCNC: 11.9 G/DL — LOW (ref 14–18)
HYPOCHROMIA BLD QL: SLIGHT — SIGNIFICANT CHANGE UP
INR BLD: 1.38 RATIO — HIGH (ref 0.88–1.16)
LYMPHOCYTES # BLD AUTO: 1.4 K/UL — SIGNIFICANT CHANGE UP (ref 1–4.8)
LYMPHOCYTES # BLD AUTO: 18 % — LOW (ref 20–55)
MACROCYTES BLD QL: SLIGHT — SIGNIFICANT CHANGE UP
MAGNESIUM SERPL-MCNC: 2.1 MG/DL — SIGNIFICANT CHANGE UP (ref 1.6–2.6)
MCHC RBC-ENTMCNC: 33.9 PG — HIGH (ref 27–31)
MCHC RBC-ENTMCNC: 35.3 G/DL — SIGNIFICANT CHANGE UP (ref 32–36)
MCV RBC AUTO: 96 FL — HIGH (ref 80–94)
MONOCYTES # BLD AUTO: 0.8 K/UL — SIGNIFICANT CHANGE UP (ref 0–0.8)
MONOCYTES NFR BLD AUTO: 10 % — SIGNIFICANT CHANGE UP (ref 3–10)
NEUTROPHILS # BLD AUTO: 2.8 K/UL — SIGNIFICANT CHANGE UP (ref 1.8–8)
NEUTROPHILS NFR BLD AUTO: 62 % — SIGNIFICANT CHANGE UP (ref 37–73)
NEUTS BAND # BLD: 7 % — SIGNIFICANT CHANGE UP (ref 0–8)
OVALOCYTES BLD QL SMEAR: SLIGHT — SIGNIFICANT CHANGE UP
PLAT MORPH BLD: NORMAL — SIGNIFICANT CHANGE UP
PLATELET # BLD AUTO: 229 K/UL — SIGNIFICANT CHANGE UP (ref 150–400)
POIKILOCYTOSIS BLD QL AUTO: SLIGHT — SIGNIFICANT CHANGE UP
POTASSIUM SERPL-MCNC: 4 MMOL/L — SIGNIFICANT CHANGE UP (ref 3.5–5.3)
POTASSIUM SERPL-SCNC: 4 MMOL/L — SIGNIFICANT CHANGE UP (ref 3.5–5.3)
PROT SERPL-MCNC: 5.8 G/DL — LOW (ref 6.6–8.7)
PROTHROM AB SERPL-ACNC: 15.3 SEC — HIGH (ref 9.8–12.7)
RBC # BLD: 3.51 M/UL — LOW (ref 4.6–6.2)
RBC # FLD: 18.4 % — HIGH (ref 11–15.6)
RBC BLD AUTO: NORMAL — SIGNIFICANT CHANGE UP
SODIUM SERPL-SCNC: 135 MMOL/L — SIGNIFICANT CHANGE UP (ref 135–145)
TARGETS BLD QL SMEAR: SLIGHT — SIGNIFICANT CHANGE UP
WBC # BLD: 5.8 K/UL — SIGNIFICANT CHANGE UP (ref 4.8–10.8)
WBC # FLD AUTO: 5.8 K/UL — SIGNIFICANT CHANGE UP (ref 4.8–10.8)

## 2018-04-20 PROCEDURE — 93306 TTE W/DOPPLER COMPLETE: CPT | Mod: 26

## 2018-04-20 PROCEDURE — 99233 SBSQ HOSP IP/OBS HIGH 50: CPT

## 2018-04-20 PROCEDURE — 99232 SBSQ HOSP IP/OBS MODERATE 35: CPT

## 2018-04-20 RX ADMIN — Medication 5 MILLIGRAM(S): at 18:07

## 2018-04-20 RX ADMIN — FINASTERIDE 5 MILLIGRAM(S): 5 TABLET, FILM COATED ORAL at 18:07

## 2018-04-20 RX ADMIN — Medication 1 MILLIGRAM(S): at 14:05

## 2018-04-20 RX ADMIN — TAMSULOSIN HYDROCHLORIDE 0.4 MILLIGRAM(S): 0.4 CAPSULE ORAL at 14:05

## 2018-04-20 RX ADMIN — DRONEDARONE 400 MILLIGRAM(S): 400 TABLET, FILM COATED ORAL at 18:07

## 2018-04-20 NOTE — PROGRESS NOTE ADULT - SUBJECTIVE AND OBJECTIVE BOX
Patient seen and examined;  chart reviewed.  Last dose of plavix/ aspirin was on 4/16.  Held for procedure.  Notes modest pruritis.  No N/V.  Fair appetite.  Denies SOB or PAZ.  Probably had TAVR at [facility].  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  No intervention in the past 8 months.      PAST MEDICAL & SURGICAL HISTORY:  BPH (benign prostatic hyperplasia)  High cholesterol  CAD (coronary artery disease)  No significant past surgical history      ROS:  No Heartburn, regurgitation, dysphagia, odynophagia.  No dyspepsia  No abdominal pain.    No Nausea, vomiting.  No Bleeding.  No hematemesis.   No diarrhea.    No hematochesia.  No weight loss, anorexia.  No edema.      MEDICATIONS  (STANDING):  atorvastatin 20 milliGRAM(s) Oral at bedtime  diltiazem    Tablet 120 milliGRAM(s) Oral daily  dronedarone 400 milliGRAM(s) Oral with dinner  enalapril 5 milliGRAM(s) Oral two times a day  finasteride 5 milliGRAM(s) Oral daily  folic acid 1 milliGRAM(s) Oral daily  phytonadione   Solution 5 milliGRAM(s) Oral daily  tamsulosin 0.4 milliGRAM(s) Oral daily    MEDICATIONS  (PRN):      Allergies    No Known Allergies    Intolerances        Vital Signs Last 24 Hrs  T(C): 36.6 (20 Apr 2018 05:31), Max: 36.7 (19 Apr 2018 08:50)  T(F): 97.8 (20 Apr 2018 05:31), Max: 98.1 (19 Apr 2018 08:50)  HR: 70 (20 Apr 2018 05:31) (63 - 78)  BP: 93/53 (20 Apr 2018 05:31) (93/53 - 146/69)  BP(mean): --  RR: 18 (20 Apr 2018 05:31) (18 - 21)  SpO2: 97% (20 Apr 2018 08:30) (95% - 97%)    PHYSICAL EXAM:    GENERAL: NAD, well-groomed, well-developed  HEAD:  Atraumatic, Normocephalic  EYES: EOMI, PERRLA, conjunctiva and sclera clear  ENMT: No tonsillar erythema, exudates, or enlargement; Moist mucous membranes, Good dentition, No lesions  NECK: Supple, No JVD, Normal thyroid  CHEST/LUNG: Clear to percussion bilaterally; No rales, rhonchi, wheezing, or rubs  HEART: Regular rate and rhythm; No murmurs, rubs, or gallops  ABDOMEN: Soft, Nontender, Nondistended; Bowel sounds present  EXTREMITIES:  2+ Peripheral Pulses, No clubbing, cyanosis, or edema  LYMPH: No lymphadenopathy noted  SKIN: No rashes or lesions      LABS:                        11.9   5.8   )-----------( 229      ( 20 Apr 2018 05:53 )             33.7     04-20    135  |  102  |  24.0<H>  ----------------------------<  115  4.0   |  22.0  |  0.24<L>    Ca    8.6      20 Apr 2018 05:50  Phos  3.6     04-19  Mg     2.1     04-20    TPro  5.8<L>  /  Alb  3.2<L>  /  TBili  17.6<H>  /  DBili  x   /  AST  137<H>  /  ALT  140<H>  /  AlkPhos  601<H>  04-20    PT/INR - ( 20 Apr 2018 05:49 )   PT: 15.3 sec;   INR: 1.38 ratio         PTT - ( 18 Apr 2018 11:16 )  PTT:41.6 sec         RADIOLOGY & ADDITIONAL STUDIES:  CT/ Sono/ MRI:  Mass in HOP 1.5 cm causnig Biliary and PD obstruction.  Biliary dilitation and dilated GB.  No mets.

## 2018-04-20 NOTE — PROGRESS NOTE ADULT - SUBJECTIVE AND OBJECTIVE BOX
Internal Medicine Hospitalist - Dr. No EUGENE    [id number]    89y      Male    Patient is a 89y old  Male who presents with a chief complaint of Transfer from [place] for ERCP (18 Apr 2018 16:53)      INTERVAL HPI/ OVERNIGHT EVENTS: Patient is seen and examined, denied abd. pain, nausea and vomiting.     REVIEW OF SYSTEMS:    Denied fever, chills, abd. pain, nausea, vomiting, chest pain, SOB, headache, dizziness    PHYSICAL EXAM:    Vital Signs Last 24 Hrs  T(C): 37.1 (20 Apr 2018 09:17), Max: 37.1 (20 Apr 2018 09:17)  T(F): 98.7 (20 Apr 2018 09:17), Max: 98.7 (20 Apr 2018 09:17)  HR: 76 (20 Apr 2018 09:17) (70 - 78)  BP: 120/63 (20 Apr 2018 09:17) (93/53 - 146/69)  BP(mean): --  RR: 18 (20 Apr 2018 09:17) (18 - 18)  SpO2: 95% (20 Apr 2018 09:17) (95% - 97%)    GENERAL: Jaundiced   HEENT: EOMI  Neck: supple  CHEST/LUNG: CTA b/l   HEART: S1S2+ audible  ABDOMEN: Soft, Nontender, Nondistended; Bowel sounds present  EXTREMITIES:  no edema  CNS: AAO X 3  Psychiatry: normal mood    LABS:                        11.9   5.8   )-----------( 229      ( 20 Apr 2018 05:53 )             33.7     04-20    135  |  102  |  24.0<H>  ----------------------------<  115  4.0   |  22.0  |  0.24<L>    Ca    8.6      20 Apr 2018 05:50  Phos  3.6     04-19  Mg     2.1     04-20    TPro  5.8<L>  /  Alb  3.2<L>  /  TBili  17.6<H>  /  DBili  x   /  AST  137<H>  /  ALT  140<H>  /  AlkPhos  601<H>  04-20    PT/INR - ( 20 Apr 2018 05:49 )   PT: 15.3 sec;   INR: 1.38 ratio                 MEDICATIONS  (STANDING):  atorvastatin 20 milliGRAM(s) Oral at bedtime  diltiazem    Tablet 120 milliGRAM(s) Oral daily  dronedarone 400 milliGRAM(s) Oral with dinner  enalapril 5 milliGRAM(s) Oral two times a day  finasteride 5 milliGRAM(s) Oral daily  folic acid 1 milliGRAM(s) Oral daily  phytonadione   Solution 5 milliGRAM(s) Oral daily  tamsulosin 0.4 milliGRAM(s) Oral daily    MEDICATIONS  (PRN):      RADIOLOGY & ADDITIONAL TEST

## 2018-04-20 NOTE — PROGRESS NOTE ADULT - SUBJECTIVE AND OBJECTIVE BOX
CARDIOLOGY PROGRESS NOTE   ([facility])                                                                                                        Subjective:   Alert, awake  NAD, Denied dyspnea, CP    Vitals:  T(C): 37.1 (04-20-18 @ 09:17), Max: 37.1 (04-20-18 @ 09:17)  HR: 76 (04-20-18 @ 09:17) (63 - 78)  BP: 120/63 (04-20-18 @ 09:17) (93/53 - 146/69)  RR: 18 (04-20-18 @ 09:17) (18 - 21)  SpO2: 95% (04-20-18 @ 09:17) (95% - 97%)  Wt(kg): --  I&O's Summary        PHYSICAL EXAM:  Appearance: Normal	  HEENT:   Atraumatic  Cardiovascular: Normal S1 S2, No JVD, No murmurs, No edema  Respiratory: Lungs clear to auscultation	  Gastrointestinal:  Soft, Non-tender, + BS	  Skin: No rashes, No ecchymoses, No cyanosis  Neurologic: Alert and awake, able to move extremities  Extremities: No edema    TELEMETRY: 	    ECG:  	      DIAGNOSTIC TESTING:  [ ] Echocardiogram:  [ ]  Catheterization:  [ ] Stress Test:    OTHER: 	      CURRENT MEDICATIONS:  diltiazem    Tablet 120 milliGRAM(s) Oral daily  dronedarone 400 milliGRAM(s) Oral with dinner  enalapril 5 milliGRAM(s) Oral two times a day  tamsulosin 0.4 milliGRAM(s) Oral daily            atorvastatin 20 milliGRAM(s) Oral at bedtime  finasteride 5 milliGRAM(s) Oral daily    folic acid 1 milliGRAM(s) Oral daily  phytonadione   Solution 5 milliGRAM(s) Oral daily      LABS:	 	    CARDIAC MARKERS:  Troponin I:   CPK total =  CK MB=   CKMB index=                           11.9   5.8   )-----------( 229      ( 20 Apr 2018 05:53 )             33.7     04-20    135  |  102  |  24.0<H>  ----------------------------<  115  4.0   |  22.0  |  0.24<L>    Ca    8.6      20 Apr 2018 05:50  Phos  3.6     04-19  Mg     2.1     04-20    TPro  5.8<L>  /  Alb  3.2<L>  /  TBili  17.6<H>  /  DBili  x   /  AST  137<H>  /  ALT  140<H>  /  AlkPhos  601<H>  04-20    proBNP:   Lipid Profile:   HgA1c:   TSH: CARDIOLOGY PROGRESS NOTE   ([facility])                                                                                                        Subjective:   Alert, awake  NAD, Denied dyspnea, CP    Vitals:  T(C): 37.1 (04-20-18 @ 09:17), Max: 37.1 (04-20-18 @ 09:17)  HR: 76 (04-20-18 @ 09:17) (63 - 78)  BP: 120/63 (04-20-18 @ 09:17) (93/53 - 146/69)  RR: 18 (04-20-18 @ 09:17) (18 - 21)  SpO2: 95% (04-20-18 @ 09:17) (95% - 97%)  Wt(kg): --  I&O's Summary        PHYSICAL EXAM:  Appearance: Normal	  HEENT:   Atraumatic  Cardiovascular: Normal S1 S2, No JVD, 2/6 SM at RUSB  Respiratory: Lungs clear to auscultation	  Gastrointestinal:  Soft, Non-tender, + BS	  Skin: No rashes, No ecchymoses, No cyanosis  Neurologic: Alert and awake, able to move extremities  Extremities: No edema    TELEMETRY: 	        CURRENT MEDICATIONS:  diltiazem    Tablet 120 milliGRAM(s) Oral daily  dronedarone 400 milliGRAM(s) Oral with dinner  enalapril 5 milliGRAM(s) Oral two times a day  tamsulosin 0.4 milliGRAM(s) Oral daily            atorvastatin 20 milliGRAM(s) Oral at bedtime  finasteride 5 milliGRAM(s) Oral daily    folic acid 1 milliGRAM(s) Oral daily  phytonadione   Solution 5 milliGRAM(s) Oral daily      LABS:	 	                        11.9   5.8   )-----------( 229      ( 20 Apr 2018 05:53 )             33.7     04-20    135  |  102  |  24.0<H>  ----------------------------<  115  4.0   |  22.0  |  0.24<L>    Ca    8.6      20 Apr 2018 05:50  Phos  3.6     04-19  Mg     2.1     04-20    TPro  5.8<L>  /  Alb  3.2<L>  /  TBili  17.6<H>  /  DBili  x   /  AST  137<H>  /  ALT  140<H>  /  AlkPhos  601<H>  04-20

## 2018-04-21 LAB
ALBUMIN SERPL ELPH-MCNC: 3.1 G/DL — LOW (ref 3.3–5.2)
ALP SERPL-CCNC: 542 U/L — HIGH (ref 40–120)
ALT FLD-CCNC: 134 U/L — HIGH
ANION GAP SERPL CALC-SCNC: 13 MMOL/L — SIGNIFICANT CHANGE UP (ref 5–17)
AST SERPL-CCNC: 137 U/L — HIGH
BILIRUB SERPL-MCNC: 18.2 MG/DL — HIGH (ref 0.4–2)
BUN SERPL-MCNC: 21 MG/DL — HIGH (ref 8–20)
CALCIUM SERPL-MCNC: 8.5 MG/DL — LOW (ref 8.6–10.2)
CHLORIDE SERPL-SCNC: 104 MMOL/L — SIGNIFICANT CHANGE UP (ref 98–107)
CHOLEST SERPL-MCNC: 126 MG/DL — SIGNIFICANT CHANGE UP (ref 110–199)
CO2 SERPL-SCNC: 22 MMOL/L — SIGNIFICANT CHANGE UP (ref 22–29)
CREAT SERPL-MCNC: 0.33 MG/DL — LOW (ref 0.5–1.3)
GLUCOSE SERPL-MCNC: 101 MG/DL — SIGNIFICANT CHANGE UP (ref 70–115)
HCT VFR BLD CALC: 32.1 % — LOW (ref 42–52)
HDLC SERPL-MCNC: 10 MG/DL — LOW
HGB BLD-MCNC: 11.4 G/DL — LOW (ref 14–18)
INR BLD: 1.46 RATIO — HIGH (ref 0.88–1.16)
LIPID PNL WITH DIRECT LDL SERPL: 82 MG/DL — SIGNIFICANT CHANGE UP
MCHC RBC-ENTMCNC: 33.8 PG — HIGH (ref 27–31)
MCHC RBC-ENTMCNC: 35.5 G/DL — SIGNIFICANT CHANGE UP (ref 32–36)
MCV RBC AUTO: 95.3 FL — HIGH (ref 80–94)
PLATELET # BLD AUTO: 222 K/UL — SIGNIFICANT CHANGE UP (ref 150–400)
POTASSIUM SERPL-MCNC: 4 MMOL/L — SIGNIFICANT CHANGE UP (ref 3.5–5.3)
POTASSIUM SERPL-SCNC: 4 MMOL/L — SIGNIFICANT CHANGE UP (ref 3.5–5.3)
PROT SERPL-MCNC: 5.5 G/DL — LOW (ref 6.6–8.7)
PROTHROM AB SERPL-ACNC: 16.2 SEC — HIGH (ref 9.8–12.7)
RBC # BLD: 3.37 M/UL — LOW (ref 4.6–6.2)
RBC # FLD: 18.5 % — HIGH (ref 11–15.6)
SODIUM SERPL-SCNC: 139 MMOL/L — SIGNIFICANT CHANGE UP (ref 135–145)
TOTAL CHOLESTEROL/HDL RATIO MEASUREMENT: 13 RATIO — HIGH (ref 3.4–9.6)
TRIGL SERPL-MCNC: 168 MG/DL — SIGNIFICANT CHANGE UP (ref 10–200)
WBC # BLD: 5.5 K/UL — SIGNIFICANT CHANGE UP (ref 4.8–10.8)
WBC # FLD AUTO: 5.5 K/UL — SIGNIFICANT CHANGE UP (ref 4.8–10.8)

## 2018-04-21 PROCEDURE — 99233 SBSQ HOSP IP/OBS HIGH 50: CPT

## 2018-04-21 RX ORDER — ERTAPENEM SODIUM 1 G/1
1000 INJECTION, POWDER, LYOPHILIZED, FOR SOLUTION INTRAMUSCULAR; INTRAVENOUS EVERY 24 HOURS
Qty: 0 | Refills: 0 | Status: DISCONTINUED | OUTPATIENT
Start: 2018-04-22 | End: 2018-04-24

## 2018-04-21 RX ORDER — INDOMETHACIN 50 MG
100 CAPSULE ORAL ONCE
Qty: 0 | Refills: 0 | Status: COMPLETED | OUTPATIENT
Start: 2018-04-23 | End: 2018-04-23

## 2018-04-21 RX ORDER — ERTAPENEM SODIUM 1 G/1
INJECTION, POWDER, LYOPHILIZED, FOR SOLUTION INTRAMUSCULAR; INTRAVENOUS
Qty: 0 | Refills: 0 | Status: DISCONTINUED | OUTPATIENT
Start: 2018-04-21 | End: 2018-04-24

## 2018-04-21 RX ORDER — SACCHAROMYCES BOULARDII 250 MG
250 POWDER IN PACKET (EA) ORAL
Qty: 0 | Refills: 0 | Status: DISCONTINUED | OUTPATIENT
Start: 2018-04-21 | End: 2018-04-24

## 2018-04-21 RX ORDER — PHYTONADIONE (VIT K1) 5 MG
5 TABLET ORAL ONCE
Qty: 0 | Refills: 0 | Status: COMPLETED | OUTPATIENT
Start: 2018-04-21 | End: 2018-04-21

## 2018-04-21 RX ORDER — ERTAPENEM SODIUM 1 G/1
1000 INJECTION, POWDER, LYOPHILIZED, FOR SOLUTION INTRAMUSCULAR; INTRAVENOUS ONCE
Qty: 0 | Refills: 0 | Status: COMPLETED | OUTPATIENT
Start: 2018-04-21 | End: 2018-04-21

## 2018-04-21 RX ADMIN — Medication 1 MILLIGRAM(S): at 13:03

## 2018-04-21 RX ADMIN — ERTAPENEM SODIUM 120 MILLIGRAM(S): 1 INJECTION, POWDER, LYOPHILIZED, FOR SOLUTION INTRAMUSCULAR; INTRAVENOUS at 13:04

## 2018-04-21 RX ADMIN — FINASTERIDE 5 MILLIGRAM(S): 5 TABLET, FILM COATED ORAL at 13:03

## 2018-04-21 RX ADMIN — TAMSULOSIN HYDROCHLORIDE 0.4 MILLIGRAM(S): 0.4 CAPSULE ORAL at 13:03

## 2018-04-21 RX ADMIN — Medication 5 MILLIGRAM(S): at 05:06

## 2018-04-21 RX ADMIN — Medication 5 MILLIGRAM(S): at 18:29

## 2018-04-21 RX ADMIN — Medication 250 MILLIGRAM(S): at 18:29

## 2018-04-21 RX ADMIN — Medication 101 MILLIGRAM(S): at 13:04

## 2018-04-21 RX ADMIN — Medication 5 MILLIGRAM(S): at 13:23

## 2018-04-21 RX ADMIN — DRONEDARONE 400 MILLIGRAM(S): 400 TABLET, FILM COATED ORAL at 18:29

## 2018-04-21 NOTE — PROGRESS NOTE ADULT - PROBLEM SELECTOR PLAN 1
EUS and ERCP to be done Monday because of pt's Plavix use PTA. More Vitamin K ordered to bring INR down. NPO after MN Sunday. Repeat labs ordered for the AM. EUS and ERCP to be done Monday because of pt's Plavix use PTA. IV Vitamin K ordered to bring INR down. NPO after MN Sunday. Repeat labs ordered for the AM. Invanz ordered for cholangitis prophylaxsis pending ERCP with biliary stenting Monday.

## 2018-04-21 NOTE — PROGRESS NOTE ADULT - SUBJECTIVE AND OBJECTIVE BOX
CHIEF COMPLAINT/INTERVAL HISTORY:    Patient is a 89y old  Male who presents with a chief complaint of Transfer from [place] for ERCP (18 Apr 2018 16:53)      HPI:  89 yo M with h/o HTN, CAD, TAVR, anemia, BPH was transferred from [facility] for newly found pancreatic mass necessitating EUS/ERCP. Pt reports that he has been in his usual state of health until his son noticed that he was not "looking right". Pt went to PMD who then sent him to [facility]. CT abdomen showed 2cm lesion in pancreas. Denies any unintentional weight loss, loss of appetite, recent infection.     In the ED, Tbili was 17. (18 Apr 2018 16:53)      SUBJECTIVE & OBJECTIVE/ ROS: Pt seen and examined at bedside. No chest pain, palpitations, sob, light headedness/dizziness, difficulty breathing/cough, fevers/chills, abdominal pain, n/v, diarrhea/constipation, dysuria or increased urinary frequency.     ICU Vital Signs Last 24 Hrs  T(C): 36.8 (21 Apr 2018 04:50), Max: 36.8 (20 Apr 2018 18:09)  T(F): 98.3 (21 Apr 2018 04:50), Max: 98.3 (20 Apr 2018 18:09)  HR: 74 (21 Apr 2018 04:50) (72 - 76)  BP: 110/68 (21 Apr 2018 04:50) (110/68 - 128/72)  BP(mean): --  ABP: --  ABP(mean): --  RR: 18 (21 Apr 2018 04:50) (16 - 18)  SpO2: 94% (21 Apr 2018 04:50) (91% - 96%)        MEDICATIONS  (STANDING):  diltiazem    Tablet 120 milliGRAM(s) Oral daily  dronedarone 400 milliGRAM(s) Oral with dinner  enalapril 5 milliGRAM(s) Oral two times a day  ertapenem  IVPB      finasteride 5 milliGRAM(s) Oral daily  folic acid 1 milliGRAM(s) Oral daily  indomethacin Suppository 100 milliGRAM(s) Rectal once  saccharomyces boulardii 250 milliGRAM(s) Oral two times a day  tamsulosin 0.4 milliGRAM(s) Oral daily    MEDICATIONS  (PRN):      LABS:                        11.4   5.5   )-----------( 222      ( 21 Apr 2018 06:14 )             32.1     04-21    139  |  104  |  21.0<H>  ----------------------------<  101  4.0   |  22.0  |  0.33<L>    Ca    8.5<L>      21 Apr 2018 06:14  Mg     2.1     04-20    TPro  5.5<L>  /  Alb  3.1<L>  /  TBili  18.2<H>  /  DBili  x   /  AST  137<H>  /  ALT  134<H>  /  AlkPhos  542<H>  04-21    PT/INR - ( 21 Apr 2018 06:14 )   PT: 16.2 sec;   INR: 1.46 ratio               CAPILLARY BLOOD GLUCOSE          RECENT CULTURES:      RADIOLOGY & ADDITIONAL TESTS:      PHYSICAL EXAM:    GENERAL: Jaundiced   HEENT: EOMI  Neck: supple  CHEST/LUNG: CTA b/l   HEART: S1S2+ audible  ABDOMEN: Soft, Nontender, Nondistended; Bowel sounds present  EXTREMITIES:  no edema  CNS: AAO X 3  Psychiatry: normal mood

## 2018-04-21 NOTE — PROGRESS NOTE ADULT - SUBJECTIVE AND OBJECTIVE BOX
Pt seen and examined. Condition unchanged. For EUS and ERCP with biliary stent placement Monday.     REVIEW OF SYSTEMS:  Constitutional: No fever, weight loss or fatigue  Cardiovascular: No chest pain, palpitations, dizziness or leg swelling  Gastrointestinal: No abdominal or epigastric pain. No nausea, vomiting or hematemesis; No diarrhea or constipation. No melena or hematochezia.  Skin: No itching, burning, rashes or lesions       MEDICATIONS:  MEDICATIONS  (STANDING):  diltiazem    Tablet 120 milliGRAM(s) Oral daily  dronedarone 400 milliGRAM(s) Oral with dinner  enalapril 5 milliGRAM(s) Oral two times a day  finasteride 5 milliGRAM(s) Oral daily  folic acid 1 milliGRAM(s) Oral daily  phytonadione   Solution 5 milliGRAM(s) Oral daily  tamsulosin 0.4 milliGRAM(s) Oral daily    MEDICATIONS  (PRN):      Allergies    No Known Allergies    Intolerances        Vital Signs Last 24 Hrs  T(C): 36.8 (21 Apr 2018 04:50), Max: 36.8 (20 Apr 2018 18:09)  T(F): 98.3 (21 Apr 2018 04:50), Max: 98.3 (20 Apr 2018 18:09)  HR: 74 (21 Apr 2018 04:50) (72 - 76)  BP: 110/68 (21 Apr 2018 04:50) (110/68 - 128/72)  BP(mean): --  RR: 18 (21 Apr 2018 04:50) (16 - 18)  SpO2: 94% (21 Apr 2018 04:50) (91% - 96%)    04-21 @ 07:01  -  04-21 @ 09:22  --------------------------------------------------------  IN: 0 mL / OUT: 200 mL / NET: -200 mL        PHYSICAL EXAM:    General: Well developed; well nourished; in no acute distress  HEENT: MMM, conjunctiva pink and sclera icteric  Lungs: clear to auscultation bilaterally.  Cor: RRR S1, S2 only.  Gastrointestinal: Abdomen: Soft non-tender non-distended; Normal bowel sounds; No hepatosplenomegaly  no surgical scars.  Extremities: no cyanosis, clubbing or edema.  Skin: Warm and dry. No obvious rash, deeply icteric.  Neuro: Pt. a + o x 3    LABS:      CBC Full  -  ( 21 Apr 2018 06:14 )  WBC Count : 5.5 K/uL  Hemoglobin : 11.4 g/dL  Hematocrit : 32.1 %  Platelet Count - Automated : 222 K/uL  Mean Cell Volume : 95.3 fl  Mean Cell Hemoglobin : 33.8 pg  Mean Cell Hemoglobin Concentration : 35.5 g/dL  Auto Neutrophil # : x  Auto Lymphocyte # : x  Auto Monocyte # : x  Auto Eosinophil # : x  Auto Basophil # : x  Auto Neutrophil % : x  Auto Lymphocyte % : x  Auto Monocyte % : x  Auto Eosinophil % : x  Auto Basophil % : x    04-21    139  |  104  |  21.0<H>  ----------------------------<  101  4.0   |  22.0  |  0.33<L>    Ca    8.5<L>      21 Apr 2018 06:14  Mg     2.1     04-20    TPro  5.5<L>  /  Alb  3.1<L>  /  TBili  18.2<H>  /  DBili  x   /  AST  137<H>  /  ALT  134<H>  /  AlkPhos  542<H>  04-21    PT/INR - ( 21 Apr 2018 06:14 )   PT: 16.2 sec;   INR: 1.46 ratio                           RADIOLOGY & ADDITIONAL STUDIES (The following images were personally reviewed):

## 2018-04-22 ENCOUNTER — TRANSCRIPTION ENCOUNTER (OUTPATIENT)
Age: 83
End: 2018-04-22

## 2018-04-22 LAB
ALBUMIN SERPL ELPH-MCNC: 3.1 G/DL — LOW (ref 3.3–5.2)
ALP SERPL-CCNC: 519 U/L — HIGH (ref 40–120)
ALT FLD-CCNC: 130 U/L — HIGH
ANION GAP SERPL CALC-SCNC: 14 MMOL/L — SIGNIFICANT CHANGE UP (ref 5–17)
APTT BLD: 33.3 SEC — SIGNIFICANT CHANGE UP (ref 27.5–37.4)
AST SERPL-CCNC: 132 U/L — HIGH
BASOPHILS # BLD AUTO: 0 K/UL — SIGNIFICANT CHANGE UP (ref 0–0.2)
BASOPHILS NFR BLD AUTO: 0.2 % — SIGNIFICANT CHANGE UP (ref 0–2)
BILIRUB SERPL-MCNC: 21.5 MG/DL — HIGH (ref 0.4–2)
BUN SERPL-MCNC: 20 MG/DL — SIGNIFICANT CHANGE UP (ref 8–20)
CALCIUM SERPL-MCNC: 8.7 MG/DL — SIGNIFICANT CHANGE UP (ref 8.6–10.2)
CHLORIDE SERPL-SCNC: 102 MMOL/L — SIGNIFICANT CHANGE UP (ref 98–107)
CO2 SERPL-SCNC: 23 MMOL/L — SIGNIFICANT CHANGE UP (ref 22–29)
CREAT SERPL-MCNC: 0.2 MG/DL — LOW (ref 0.5–1.3)
EOSINOPHIL # BLD AUTO: 0.2 K/UL — SIGNIFICANT CHANGE UP (ref 0–0.5)
EOSINOPHIL NFR BLD AUTO: 3.4 % — SIGNIFICANT CHANGE UP (ref 0–6)
GLUCOSE SERPL-MCNC: 102 MG/DL — SIGNIFICANT CHANGE UP (ref 70–115)
HCT VFR BLD CALC: 30.3 % — LOW (ref 42–52)
HGB BLD-MCNC: 11 G/DL — LOW (ref 14–18)
INR BLD: 1.03 RATIO — SIGNIFICANT CHANGE UP (ref 0.88–1.16)
LYMPHOCYTES # BLD AUTO: 1.3 K/UL — SIGNIFICANT CHANGE UP (ref 1–4.8)
LYMPHOCYTES # BLD AUTO: 20.5 % — SIGNIFICANT CHANGE UP (ref 20–55)
MAGNESIUM SERPL-MCNC: 2 MG/DL — SIGNIFICANT CHANGE UP (ref 1.6–2.6)
MCHC RBC-ENTMCNC: 33.7 PG — HIGH (ref 27–31)
MCHC RBC-ENTMCNC: 36.3 G/DL — HIGH (ref 32–36)
MCV RBC AUTO: 92.9 FL — SIGNIFICANT CHANGE UP (ref 80–94)
MONOCYTES # BLD AUTO: 0.9 K/UL — HIGH (ref 0–0.8)
MONOCYTES NFR BLD AUTO: 14.1 % — HIGH (ref 3–10)
NEUTROPHILS # BLD AUTO: 3.7 K/UL — SIGNIFICANT CHANGE UP (ref 1.8–8)
NEUTROPHILS NFR BLD AUTO: 59.6 % — SIGNIFICANT CHANGE UP (ref 37–73)
PHOSPHATE SERPL-MCNC: 3.2 MG/DL — SIGNIFICANT CHANGE UP (ref 2.4–4.7)
PLATELET # BLD AUTO: 215 K/UL — SIGNIFICANT CHANGE UP (ref 150–400)
POTASSIUM SERPL-MCNC: 4.1 MMOL/L — SIGNIFICANT CHANGE UP (ref 3.5–5.3)
POTASSIUM SERPL-SCNC: 4.1 MMOL/L — SIGNIFICANT CHANGE UP (ref 3.5–5.3)
PROT SERPL-MCNC: 5.5 G/DL — LOW (ref 6.6–8.7)
PROTHROM AB SERPL-ACNC: 11.3 SEC — SIGNIFICANT CHANGE UP (ref 9.8–12.7)
RBC # BLD: 3.26 M/UL — LOW (ref 4.6–6.2)
RBC # FLD: 19.6 % — HIGH (ref 11–15.6)
SODIUM SERPL-SCNC: 139 MMOL/L — SIGNIFICANT CHANGE UP (ref 135–145)
WBC # BLD: 6.2 K/UL — SIGNIFICANT CHANGE UP (ref 4.8–10.8)
WBC # FLD AUTO: 6.2 K/UL — SIGNIFICANT CHANGE UP (ref 4.8–10.8)

## 2018-04-22 PROCEDURE — 99233 SBSQ HOSP IP/OBS HIGH 50: CPT

## 2018-04-22 PROCEDURE — 93010 ELECTROCARDIOGRAM REPORT: CPT

## 2018-04-22 RX ADMIN — ERTAPENEM SODIUM 120 MILLIGRAM(S): 1 INJECTION, POWDER, LYOPHILIZED, FOR SOLUTION INTRAMUSCULAR; INTRAVENOUS at 10:40

## 2018-04-22 RX ADMIN — FINASTERIDE 5 MILLIGRAM(S): 5 TABLET, FILM COATED ORAL at 11:33

## 2018-04-22 RX ADMIN — Medication 5 MILLIGRAM(S): at 06:34

## 2018-04-22 RX ADMIN — TAMSULOSIN HYDROCHLORIDE 0.4 MILLIGRAM(S): 0.4 CAPSULE ORAL at 11:33

## 2018-04-22 RX ADMIN — Medication 1 MILLIGRAM(S): at 11:33

## 2018-04-22 RX ADMIN — Medication 250 MILLIGRAM(S): at 17:37

## 2018-04-22 RX ADMIN — DRONEDARONE 400 MILLIGRAM(S): 400 TABLET, FILM COATED ORAL at 17:37

## 2018-04-22 RX ADMIN — Medication 5 MILLIGRAM(S): at 17:37

## 2018-04-22 RX ADMIN — Medication 250 MILLIGRAM(S): at 06:34

## 2018-04-22 NOTE — PROGRESS NOTE ADULT - SUBJECTIVE AND OBJECTIVE BOX
CARDIOLOGY PROGRESS NOTE   ([facility])                                                                                                        Subjective: dyspnea on exertion  Alert, awake      Vitals:  Vital Signs Last 24 Hrs  T(C): 36.7 (04-22-18 @ 16:37), Max: 36.7 (04-22-18 @ 06:30)  T(F): 98 (04-22-18 @ 16:37), Max: 98 (04-22-18 @ 06:30)  HR: 72 (04-22-18 @ 16:37) (64 - 72)  BP: 134/68 (04-22-18 @ 16:37) (103/61 - 134/68)  BP(mean): --  RR: 18 (04-22-18 @ 16:37) (18 - 18)  SpO2: 95% (04-22-18 @ 16:37) (93% - 98%)      PHYSICAL EXAM:  Appearance: Normal	  HEENT:   Atraumatic , + icterus  Cardiovascular: Normal S1 S2, No JVD, 2/6 SM at RUSB  Respiratory: Lungs clear to auscultation	  Gastrointestinal:  Soft, Non-tender, + BS	  Skin: No rashes, No ecchymoses, No cyanosis  Neurologic: Alert and awake, able to move extremities  Extremities: No edema    TELEMETRY: 	        CURRENT MEDICATIONS:  MEDICATIONS  (STANDING):  diltiazem    Tablet 120 milliGRAM(s) Oral daily  dronedarone 400 milliGRAM(s) Oral with dinner  enalapril 5 milliGRAM(s) Oral two times a day  tamsulosin 0.4 milliGRAM(s) Oral daily    ertapenem  IVPB  ertapenem  IVPB  finasteride  folic acid  indomethacin Suppository  saccharomyces boulardii          LABS:	 	                          11.0   6.2   )-----------( 215      ( 22 Apr 2018 07:17 )             30.3   N=x    ; L=x        22 Apr 2018 07:17    139    |  102    |  20.0   ----------------------------<  102    4.1     |  23.0   |  0.20     Ca    8.7        22 Apr 2018 07:17  Phos  3.2       22 Apr 2018 07:17  Mg     2.0       22 Apr 2018 07:17    TPro  5.5    /  Alb  3.1    /  TBili  21.5   /  DBili  x      /  AST  132    /  ALT  130    /  AlkPhos  519    22 Apr 2018 07:17      Hepatic panel: 22 Apr 2018 07:17  5.5   | 3.1                            21.5  | 21.5 /x                              132   | 130                               /519  \par                                   Tele: runs of afib with abberancy

## 2018-04-22 NOTE — PROGRESS NOTE ADULT - SUBJECTIVE AND OBJECTIVE BOX
Pt seen and examined. Cardiac clearance appreciated. 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 He has no complaints this AM.     REVIEW OF SYSTEMS:  Constitutional: No fever, weight loss or fatigue  Cardiovascular: No chest pain, palpitations, dizziness or leg swelling  Gastrointestinal: No abdominal or epigastric pain. No nausea, vomiting or hematemesis; No diarrhea or constipation. No melena or hematochezia.  Skin: No itching, burning, rashes or lesions       MEDICATIONS:  MEDICATIONS  (STANDING):  diltiazem    Tablet 120 milliGRAM(s) Oral daily  dronedarone 400 milliGRAM(s) Oral with dinner  enalapril 5 milliGRAM(s) Oral two times a day  ertapenem  IVPB 1000 milliGRAM(s) IV Intermittent every 24 hours  ertapenem  IVPB      finasteride 5 milliGRAM(s) Oral daily  folic acid 1 milliGRAM(s) Oral daily  indomethacin Suppository 100 milliGRAM(s) Rectal once  saccharomyces boulardii 250 milliGRAM(s) Oral two times a day  tamsulosin 0.4 milliGRAM(s) Oral daily    MEDICATIONS  (PRN):      Allergies    No Known Allergies    Intolerances        Vital Signs Last 24 Hrs  T(C): 36.6 (22 Apr 2018 08:22), Max: 36.9 (21 Apr 2018 18:00)  T(F): 97.8 (22 Apr 2018 08:22), Max: 98.4 (21 Apr 2018 18:00)  HR: 67 (22 Apr 2018 08:22) (64 - 76)  BP: 116/68 (22 Apr 2018 08:22) (103/61 - 116/68)  BP(mean): --  RR: 18 (22 Apr 2018 08:22) (18 - 18)  SpO2: 93% (22 Apr 2018 08:22) (93% - 98%)    04-21 @ 07:01  -  04-22 @ 07:00  --------------------------------------------------------  IN: 360 mL / OUT: 500 mL / NET: -140 mL        PHYSICAL EXAM:    General: Well developed; well nourished; in no acute distress  HEENT: MMM, conjunctiva and sclera icteric.  Lungs: clear to auscultation bilaterally.  Cor: RRR S1, S2 only.  Gastrointestinal: Abdomen: Soft non-tender non-distended; Normal bowel sounds; No hepatosplenomegaly  Extremities: no cyanosis, clubbing or edema.  Skin: Warm and dry. No obvious rash, icteric  Neuro: Pt. a + o x 3    LABS:      CBC Full  -  ( 22 Apr 2018 07:17 )  WBC Count : 6.2 K/uL  Hemoglobin : 11.0 g/dL  Hematocrit : 30.3 %  Platelet Count - Automated : 215 K/uL  Mean Cell Volume : 92.9 fl  Mean Cell Hemoglobin : 33.7 pg  Mean Cell Hemoglobin Concentration : 36.3 g/dL  Auto Neutrophil # : 3.7 K/uL  Auto Lymphocyte # : 1.3 K/uL  Auto Monocyte # : 0.9 K/uL  Auto Eosinophil # : 0.2 K/uL  Auto Basophil # : 0.0 K/uL  Auto Neutrophil % : 59.6 %  Auto Lymphocyte % : 20.5 %  Auto Monocyte % : 14.1 %  Auto Eosinophil % : 3.4 %  Auto Basophil % : 0.2 %    04-22    139  |  102  |  20.0  ----------------------------<  102  4.1   |  23.0  |  0.20<L>    Ca    8.7      22 Apr 2018 07:17  Phos  3.2     04-22  Mg     2.0     04-22    TPro  5.5<L>  /  Alb  3.1<L>  /  TBili  21.5<H>  /  DBili  x   /  AST  132<H>  /  ALT  130<H>  /  AlkPhos  519<H>  04-22    PT/INR - ( 22 Apr 2018 07:17 )   PT: 11.3 sec;   INR: 1.03 ratio         PTT - ( 22 Apr 2018 07:17 )  PTT:33.3 sec                  RADIOLOGY & ADDITIONAL STUDIES (The following images were personally reviewed):

## 2018-04-22 NOTE — PROGRESS NOTE ADULT - SUBJECTIVE AND OBJECTIVE BOX
CHIEF COMPLAINT/INTERVAL HISTORY:    Patient is a 89y old  Male who presents with a chief complaint of Transfer from [place] for ERCP (18 Apr 2018 16:53)      HPI:  89 yo M with h/o HTN, CAD, TAVR, anemia, BPH was transferred from [facility] for newly found pancreatic mass necessitating EUS/ERCP. Pt reports that he has been in his usual state of health until his son noticed that he was not "looking right". Pt went to PMD who then sent him to [facility]. CT abdomen showed 2cm lesion in pancreas. Denies any unintentional weight loss, loss of appetite, recent infection.     In the ED, Tbili was 17. (18 Apr 2018 16:53)      SUBJECTIVE & OBJECTIVE/ ROS: Pt seen and examined at bedside. Pt with 2 episodes of NSVT today. No chest pain, palpitations, sob, light headedness/dizziness at that time.   ICU Vital Signs Last 24 Hrs  T(C): 36.6 (22 Apr 2018 08:22), Max: 36.9 (21 Apr 2018 18:00)  T(F): 97.8 (22 Apr 2018 08:22), Max: 98.4 (21 Apr 2018 18:00)  HR: 67 (22 Apr 2018 08:22) (64 - 76)  BP: 116/68 (22 Apr 2018 08:22) (103/61 - 116/68)  BP(mean): --  ABP: --  ABP(mean): --  RR: 18 (22 Apr 2018 08:22) (18 - 18)  SpO2: 93% (22 Apr 2018 08:22) (93% - 98%)        MEDICATIONS  (STANDING):  diltiazem    Tablet 120 milliGRAM(s) Oral daily  dronedarone 400 milliGRAM(s) Oral with dinner  enalapril 5 milliGRAM(s) Oral two times a day  ertapenem  IVPB 1000 milliGRAM(s) IV Intermittent every 24 hours  ertapenem  IVPB      finasteride 5 milliGRAM(s) Oral daily  folic acid 1 milliGRAM(s) Oral daily  indomethacin Suppository 100 milliGRAM(s) Rectal once  saccharomyces boulardii 250 milliGRAM(s) Oral two times a day  tamsulosin 0.4 milliGRAM(s) Oral daily    MEDICATIONS  (PRN):      LABS:                        11.0   6.2   )-----------( 215      ( 22 Apr 2018 07:17 )             30.3     04-22    139  |  102  |  20.0  ----------------------------<  102  4.1   |  23.0  |  0.20<L>    Ca    8.7      22 Apr 2018 07:17  Phos  3.2     04-22  Mg     2.0     04-22    TPro  5.5<L>  /  Alb  3.1<L>  /  TBili  21.5<H>  /  DBili  x   /  AST  132<H>  /  ALT  130<H>  /  AlkPhos  519<H>  04-22    PT/INR - ( 22 Apr 2018 07:17 )   PT: 11.3 sec;   INR: 1.03 ratio         PTT - ( 22 Apr 2018 07:17 )  PTT:33.3 sec      CAPILLARY BLOOD GLUCOSE          RECENT CULTURES:      RADIOLOGY & ADDITIONAL TESTS:      PHYSICAL EXAM:    GENERAL: Jaundiced   HEENT: EOMI  Neck: supple  CHEST/LUNG: CTA b/l   HEART: S1S2+ audible  ABDOMEN: Soft, Nontender, Nondistended; Bowel sounds present  EXTREMITIES:  no edema  CNS: AAO X 3  Psychiatry: normal mood

## 2018-04-22 NOTE — PROGRESS NOTE ADULT - PROBLEM SELECTOR PROBLEM 1
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

## 2018-04-22 NOTE — PROGRESS NOTE ADULT - PROBLEM SELECTOR PLAN 1
For EUS and ERCP tomorrow. Repeat labs ordered for the AM. He appears medically optimized for the proposed procedure.

## 2018-04-23 ENCOUNTER — RESULT REVIEW (OUTPATIENT)
Age: 83
End: 2018-04-23

## 2018-04-23 LAB
ALBUMIN SERPL ELPH-MCNC: 3.1 G/DL — LOW (ref 3.3–5.2)
ALP SERPL-CCNC: 512 U/L — HIGH (ref 40–120)
ALT FLD-CCNC: 129 U/L — HIGH
ANION GAP SERPL CALC-SCNC: 13 MMOL/L — SIGNIFICANT CHANGE UP (ref 5–17)
APTT BLD: 33.2 SEC — SIGNIFICANT CHANGE UP (ref 27.5–37.4)
AST SERPL-CCNC: 134 U/L — HIGH
BASOPHILS # BLD AUTO: 0 K/UL — SIGNIFICANT CHANGE UP (ref 0–0.2)
BASOPHILS NFR BLD AUTO: 0.1 % — SIGNIFICANT CHANGE UP (ref 0–2)
BILIRUB SERPL-MCNC: 24.5 MG/DL — HIGH (ref 0.4–2)
BUN SERPL-MCNC: 19 MG/DL — SIGNIFICANT CHANGE UP (ref 8–20)
CALCIUM SERPL-MCNC: 8.8 MG/DL — SIGNIFICANT CHANGE UP (ref 8.6–10.2)
CHLORIDE SERPL-SCNC: 103 MMOL/L — SIGNIFICANT CHANGE UP (ref 98–107)
CO2 SERPL-SCNC: 21 MMOL/L — LOW (ref 22–29)
CREAT SERPL-MCNC: <0.2 MG/DL — LOW (ref 0.5–1.3)
EOSINOPHIL # BLD AUTO: 0.2 K/UL — SIGNIFICANT CHANGE UP (ref 0–0.5)
EOSINOPHIL NFR BLD AUTO: 2.7 % — SIGNIFICANT CHANGE UP (ref 0–5)
GLUCOSE SERPL-MCNC: 111 MG/DL — SIGNIFICANT CHANGE UP (ref 70–115)
HCT VFR BLD CALC: 31 % — LOW (ref 42–52)
HGB BLD-MCNC: 10.9 G/DL — LOW (ref 14–18)
INR BLD: 1.1 RATIO — SIGNIFICANT CHANGE UP (ref 0.88–1.16)
LYMPHOCYTES # BLD AUTO: 1.2 K/UL — SIGNIFICANT CHANGE UP (ref 1–4.8)
LYMPHOCYTES # BLD AUTO: 14.8 % — LOW (ref 20–55)
MAGNESIUM SERPL-MCNC: 2.1 MG/DL — SIGNIFICANT CHANGE UP (ref 1.6–2.6)
MCHC RBC-ENTMCNC: 33.4 PG — HIGH (ref 27–31)
MCHC RBC-ENTMCNC: 35.2 G/DL — SIGNIFICANT CHANGE UP (ref 32–36)
MCV RBC AUTO: 95.1 FL — HIGH (ref 80–94)
MONOCYTES # BLD AUTO: 1.2 K/UL — HIGH (ref 0–0.8)
MONOCYTES NFR BLD AUTO: 14.5 % — HIGH (ref 3–10)
NEUTROPHILS # BLD AUTO: 4.4 K/UL — SIGNIFICANT CHANGE UP (ref 1.8–8)
NEUTROPHILS NFR BLD AUTO: 54.6 % — SIGNIFICANT CHANGE UP (ref 37–73)
PHOSPHATE SERPL-MCNC: 3.2 MG/DL — SIGNIFICANT CHANGE UP (ref 2.4–4.7)
PLATELET # BLD AUTO: 231 K/UL — SIGNIFICANT CHANGE UP (ref 150–400)
POTASSIUM SERPL-MCNC: 4 MMOL/L — SIGNIFICANT CHANGE UP (ref 3.5–5.3)
POTASSIUM SERPL-SCNC: 4 MMOL/L — SIGNIFICANT CHANGE UP (ref 3.5–5.3)
PROT SERPL-MCNC: 5.5 G/DL — LOW (ref 6.6–8.7)
PROTHROM AB SERPL-ACNC: 12.1 SEC — SIGNIFICANT CHANGE UP (ref 9.8–12.7)
RBC # BLD: 3.26 M/UL — LOW (ref 4.6–6.2)
RBC # FLD: 19 % — HIGH (ref 11–15.6)
SODIUM SERPL-SCNC: 137 MMOL/L — SIGNIFICANT CHANGE UP (ref 135–145)
WBC # BLD: 8.1 K/UL — SIGNIFICANT CHANGE UP (ref 4.8–10.8)
WBC # FLD AUTO: 8.1 K/UL — SIGNIFICANT CHANGE UP (ref 4.8–10.8)

## 2018-04-23 PROCEDURE — 43242 EGD US FINE NEEDLE BX/ASPIR: CPT

## 2018-04-23 PROCEDURE — 88305 TISSUE EXAM BY PATHOLOGIST: CPT | Mod: 26

## 2018-04-23 PROCEDURE — 43239 EGD BIOPSY SINGLE/MULTIPLE: CPT | Mod: 59

## 2018-04-23 PROCEDURE — 99233 SBSQ HOSP IP/OBS HIGH 50: CPT

## 2018-04-23 PROCEDURE — 88342 IMHCHEM/IMCYTCHM 1ST ANTB: CPT | Mod: 26

## 2018-04-23 PROCEDURE — 43274 ERCP DUCT STENT PLACEMENT: CPT | Mod: 59

## 2018-04-23 RX ORDER — SODIUM CHLORIDE 9 MG/ML
1000 INJECTION, SOLUTION INTRAVENOUS
Qty: 0 | Refills: 0 | Status: DISCONTINUED | OUTPATIENT
Start: 2018-04-23 | End: 2018-04-23

## 2018-04-23 RX ORDER — METOPROLOL TARTRATE 50 MG
25 TABLET ORAL EVERY 8 HOURS
Qty: 0 | Refills: 0 | Status: DISCONTINUED | OUTPATIENT
Start: 2018-04-23 | End: 2018-04-24

## 2018-04-23 RX ORDER — FENTANYL CITRATE 50 UG/ML
25 INJECTION INTRAVENOUS
Qty: 0 | Refills: 0 | Status: DISCONTINUED | OUTPATIENT
Start: 2018-04-23 | End: 2018-04-23

## 2018-04-23 RX ORDER — ONDANSETRON 8 MG/1
4 TABLET, FILM COATED ORAL ONCE
Qty: 0 | Refills: 0 | Status: DISCONTINUED | OUTPATIENT
Start: 2018-04-23 | End: 2018-04-23

## 2018-04-23 RX ADMIN — Medication 250 MILLIGRAM(S): at 18:20

## 2018-04-23 RX ADMIN — Medication 5 MILLIGRAM(S): at 18:20

## 2018-04-23 RX ADMIN — SODIUM CHLORIDE 75 MILLILITER(S): 9 INJECTION, SOLUTION INTRAVENOUS at 18:21

## 2018-04-23 RX ADMIN — ERTAPENEM SODIUM 120 MILLIGRAM(S): 1 INJECTION, POWDER, LYOPHILIZED, FOR SOLUTION INTRAMUSCULAR; INTRAVENOUS at 08:58

## 2018-04-23 RX ADMIN — Medication 25 MILLIGRAM(S): at 21:51

## 2018-04-23 RX ADMIN — Medication 100 MILLIGRAM(S): at 15:00

## 2018-04-23 NOTE — BRIEF OPERATIVE NOTE - COMMENTS
Monitor LFTs  If continues to trend down, surgical and medical oncology evaluation  Clear liquid diet today  Advance as tolerated

## 2018-04-23 NOTE — BRIEF OPERATIVE NOTE - PROCEDURE
<<-----Click on this checkbox to enter Procedure ERCP with stent insertion into pancreatic duct  04/23/2018    Active  [id number]  ERCP with common bile duct brushing and stent placement  04/23/2018    Active  [id number]  ERCP with sphincterotomy  04/23/2018    Active  [id number]  Endoscopic ultrasound of pancreas with biopsy of pancreatic lesion  04/23/2018    Active  [id number]  EGD with biopsy  04/23/2018    Active  [id number]

## 2018-04-23 NOTE — CONSULT NOTE ADULT - SUBJECTIVE AND OBJECTIVE BOX
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 Patient reports that he has been in his usual state of health until his son noticed that he was not "looking right". He went to PMD who then sent him to [facility]. CT abdomen showed 2cm lesion in pancreas. 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 No sustained VT noted.     PAST MEDICAL & SURGICAL HISTORY:  BPH (benign prostatic hyperplasia)  High cholesterol  CAD (coronary artery disease)  No significant past surgical history    REVIEW OF SYSTEMS  Unable to obtain due to sedation following procedure.	    MEDICATIONS  (STANDING):  diltiazem    Tablet 120 milliGRAM(s) Oral daily  dronedarone 400 milliGRAM(s) Oral with dinner  enalapril 5 milliGRAM(s) Oral two times a day  ertapenem  IVPB 1000 milliGRAM(s) IV Intermittent every 24 hours  ertapenem  IVPB      finasteride 5 milliGRAM(s) Oral daily  folic acid 1 milliGRAM(s) Oral daily  indomethacin Suppository 100 milliGRAM(s) Rectal once  lactated ringers. 1000 milliLiter(s) (75 mL/Hr) IV Continuous <Continuous>  saccharomyces boulardii 250 milliGRAM(s) Oral two times a day  tamsulosin 0.4 milliGRAM(s) Oral daily    MEDICATIONS  (PRN):  fentaNYL    Injectable 25 MICROGram(s) IV Push every 5 minutes PRN Moderate Pain  ondansetron Injectable 4 milliGRAM(s) IV Push once PRN Nausea and/or Vomiting    Allergies  No Known Allergies    SOCIAL HISTORY: UTO    FAMILY HISTORY:  No pertinent family history in first degree relatives    Vital Signs Last 24 Hrs  T(C): 36 (23 Apr 2018 15:18), Max: 37.2 (22 Apr 2018 22:00)  T(F): 96.8 (23 Apr 2018 15:18), Max: 98.9 (22 Apr 2018 22:00)  HR: 70 (23 Apr 2018 16:00) (67 - 80)  BP: 140/72 (23 Apr 2018 16:00) (112/62 - 147/72)  RR: 13 (23 Apr 2018 16:00) (13 - 18)  SpO2: 99% (23 Apr 2018 16:00) (94% - 100%)    Physical Exam:  Constitutional: AAOx1, NAD, appears jaundiced.  Neck: supple, No JVD  Cardiovascular: +S1S2 RRR, 2/6 MISA   Pulmonary: Coarse breath sounds b/l, unlabored,  Abdomen: +BS, slightly tender RUQ  Extremities: trace LE edema  Neuro: non focal, speech clear, MCCARTHY x 4    LABS:                        10.9   8.1   )-----------( 231      ( 23 Apr 2018 06:14 )             31.0   04-23    137  |  103  |  19.0  ----------------------------<  111  4.0   |  21.0<L>  |  <0.20<L>    Ca    8.8      23 Apr 2018 06:14  Phos  3.2     04-23  Mg     2.1     04-23  TPro  5.5<L>  /  Alb  3.1<L>  /  TBili  24.5<H>  /  DBili  x   /  AST  134<H>  /  ALT  129<H>  /  AlkPhos  512<H>  04-23  LIVER FUNCTIONS - ( 23 Apr 2018 06:14 )  Alb: 3.1 g/dL / Pro: 5.5 g/dL / ALK PHOS: 512 U/L / ALT: 129 U/L / AST: 134 U/L / GGT: x         PT/INR - ( 23 Apr 2018 06:14 )   PT: 12.1 sec;   INR: 1.10 ratio     PTT - ( 23 Apr 2018 06:14 )  PTT:33.2 sec    RADIOLOGY & ADDITIONAL STUDIES:  Echo: 4/20/18  Summary:   1. Left ventricular ejection fraction, by visual estimation, is 60 to   65%.   2. Normal global left ventricular systolic function.   3. Spectral Doppler shows impaired relaxation pattern of left   ventricular myocardial filling (Grade I diastolic dysfunction).   4. Normal left ventricular internal cavity size.   5. There is moderate concentric left ventricular hypertrophy.   6. Moderate mitral annular calcification.   7. Degenerative mitral valve. Thickening and calcification of the   anterior and posterior mitral valve leaflets.   8. Trace mitral valve regurgitation.   9. Bioprosthesis in the aortic position.  10. Mild to moderate aortic regurgitation.  11. Estimated pulmonary artery systolic pressure is 37.2 mmHg assuming a   right atrial pressure of 8 mmHg, which is consistent with borderline   pulmonary hypertension.  12. There is no evidence of pericardial effusion.    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 EF 60-65%. PVC on EKG likely with LBBB transition V2-V3 although PVC not seen on EKG limb leads. Unable to truly localize source of ectopy.     - No telemetry evidence of sustained VT.   - Reasonable to continue Multaq for rhythm control of AFib. Patient not on anticoagulation due to anemia.   - Would use outpatient monitoring to assess PVC burden.  - Uptitrate CCB as clinically tolerated.   - Maintain K>4 Mg>2  - No acute EP intervention at this time.

## 2018-04-23 NOTE — PROGRESS NOTE ADULT - SUBJECTIVE AND OBJECTIVE BOX
CHIEF COMPLAINT/INTERVAL HISTORY:    Patient is a 89y old  Male who presents with a chief complaint of Transfer from [place] for ERCP (18 Apr 2018 16:53)      HPI:  89 yo M with h/o HTN, CAD, TAVR, anemia, BPH was transferred from [facility] for newly found pancreatic mass necessitating EUS/ERCP. Pt reports that he has been in his usual state of health until his son noticed that he was not "looking right". Pt went to PMD who then sent him to [facility]. CT abdomen showed 2cm lesion in pancreas. Denies any unintentional weight loss, loss of appetite, recent infection.     In the ED, Tbili was 17. (18 Apr 2018 16:53)      SUBJECTIVE & OBJECTIVE/ ROS: Pt seen and examined at bedside. No chest pain, palpitations, sob, light headedness/dizziness, difficulty breathing/cough, fevers/chills, abdominal pain, n/v, diarrhea/constipation, dysuria or increased urinary frequency.     ICU Vital Signs Last 24 Hrs  T(C): 36.5 (23 Apr 2018 08:59), Max: 37.2 (22 Apr 2018 22:00)  T(F): 97.7 (23 Apr 2018 08:59), Max: 98.9 (22 Apr 2018 22:00)  HR: 73 (23 Apr 2018 08:59) (67 - 80)  BP: 112/62 (23 Apr 2018 08:59) (112/62 - 134/68)  BP(mean): --  ABP: --  ABP(mean): --  RR: 16 (23 Apr 2018 08:59) (16 - 18)  SpO2: 94% (23 Apr 2018 08:59) (94% - 97%)        MEDICATIONS  (STANDING):  diltiazem    Tablet 120 milliGRAM(s) Oral daily  dronedarone 400 milliGRAM(s) Oral with dinner  enalapril 5 milliGRAM(s) Oral two times a day  ertapenem  IVPB 1000 milliGRAM(s) IV Intermittent every 24 hours  ertapenem  IVPB      finasteride 5 milliGRAM(s) Oral daily  folic acid 1 milliGRAM(s) Oral daily  indomethacin Suppository 100 milliGRAM(s) Rectal once  saccharomyces boulardii 250 milliGRAM(s) Oral two times a day  tamsulosin 0.4 milliGRAM(s) Oral daily    MEDICATIONS  (PRN):      LABS:                        10.9   8.1   )-----------( 231      ( 23 Apr 2018 06:14 )             31.0     04-23    137  |  103  |  19.0  ----------------------------<  111  4.0   |  21.0<L>  |  <0.20<L>    Ca    8.8      23 Apr 2018 06:14  Phos  3.2     04-23  Mg     2.1     04-23    TPro  5.5<L>  /  Alb  3.1<L>  /  TBili  24.5<H>  /  DBili  x   /  AST  134<H>  /  ALT  129<H>  /  AlkPhos  512<H>  04-23    PT/INR - ( 23 Apr 2018 06:14 )   PT: 12.1 sec;   INR: 1.10 ratio         PTT - ( 23 Apr 2018 06:14 )  PTT:33.2 sec      CAPILLARY BLOOD GLUCOSE          RECENT CULTURES:      RADIOLOGY & ADDITIONAL TESTS:      PHYSICAL EXAM:    GENERAL: Jaundiced   HEENT: EOMI  Neck: supple  CHEST/LUNG: CTA b/l   HEART: S1S2+ audible  ABDOMEN: Soft, Nontender, Nondistended; Bowel sounds present  EXTREMITIES:  no edema  CNS: AAO X 3  Psychiatry: normal mood

## 2018-04-23 NOTE — BRIEF OPERATIVE NOTE - PRE-OP DX
Obstructive jaundice  04/23/2018    Active  Alexis Richards  Pancreatic mass  04/23/2018    Active  Alexis Richards

## 2018-04-23 NOTE — PROGRESS NOTE ADULT - ATTENDING COMMENTS
Disccussed with pt's daughter [phone] in details
discussed with 
discussed with son present bedside, updated, answer all questions
Disccussed with pt's daughter [phone] in details

## 2018-04-23 NOTE — BRIEF OPERATIVE NOTE - OPERATION/FINDINGS
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

## 2018-04-23 NOTE — CONSULT NOTE ADULT - ATTENDING COMMENTS
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 Patient reports that he has been in his usual state of health until his son noticed that he was not "looking right". He went to PMD who then sent him to [facility]. CT abdomen showed 2cm lesion in pancreas. 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 No sustained VT noted.     PVCs appear to be LVOT in origin.  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 Patient reports that he has been in his usual state of health until his son noticed that he was not "looking right". He went to PMD who then sent him to [facility]. CT abdomen showed 2cm lesion in pancreas. 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    PVCs appear to be LVOT in origin.  d/c Cardizem given it's hepatically metabolized;  start lopressor 25mg po q 8  with hold parameters, consider d/c Multaq  observe on tele given 1st AVB and LBBB  consider outpatient MCOT to assess for PVC burden and efficacy of BB rxn  in additional to occult conduction system worsening

## 2018-04-24 ENCOUNTER — RESULT REVIEW (OUTPATIENT)
Age: 83
End: 2018-04-24

## 2018-04-24 ENCOUNTER — TRANSCRIPTION ENCOUNTER (OUTPATIENT)
Age: 83
End: 2018-04-24

## 2018-04-24 VITALS — TEMPERATURE: 98 F | SYSTOLIC BLOOD PRESSURE: 153 MMHG | HEART RATE: 73 BPM | DIASTOLIC BLOOD PRESSURE: 76 MMHG

## 2018-04-24 LAB
ALBUMIN SERPL ELPH-MCNC: 3.1 G/DL — LOW (ref 3.3–5.2)
ALP SERPL-CCNC: 537 U/L — HIGH (ref 40–120)
ALT FLD-CCNC: 122 U/L — HIGH
ANION GAP SERPL CALC-SCNC: 14 MMOL/L — SIGNIFICANT CHANGE UP (ref 5–17)
ANISOCYTOSIS BLD QL: SLIGHT — SIGNIFICANT CHANGE UP
AST SERPL-CCNC: 134 U/L — HIGH
BASOPHILS NFR BLD AUTO: 1 % — SIGNIFICANT CHANGE UP (ref 0–2)
BILIRUB DIRECT SERPL-MCNC: >10 MG/DL — HIGH (ref 0–0.3)
BILIRUB INDIRECT FLD-MCNC: <14.4 MG/DL — HIGH (ref 0.2–1)
BILIRUB SERPL-MCNC: 24.4 MG/DL — HIGH (ref 0.4–2)
BUN SERPL-MCNC: 26 MG/DL — HIGH (ref 8–20)
CALCIUM SERPL-MCNC: 9.2 MG/DL — SIGNIFICANT CHANGE UP (ref 8.6–10.2)
CHLORIDE SERPL-SCNC: 100 MMOL/L — SIGNIFICANT CHANGE UP (ref 98–107)
CO2 SERPL-SCNC: 22 MMOL/L — SIGNIFICANT CHANGE UP (ref 22–29)
CREAT SERPL-MCNC: <0.2 MG/DL — LOW (ref 0.5–1.3)
GLUCOSE SERPL-MCNC: 179 MG/DL — HIGH (ref 70–115)
HCT VFR BLD CALC: 32.8 % — LOW (ref 42–52)
HGB BLD-MCNC: 12.2 G/DL — LOW (ref 14–18)
HYPOCHROMIA BLD QL: SLIGHT — SIGNIFICANT CHANGE UP
LYMPHOCYTES # BLD AUTO: 19 % — LOW (ref 20–55)
MACROCYTES BLD QL: SLIGHT — SIGNIFICANT CHANGE UP
MAGNESIUM SERPL-MCNC: 2.2 MG/DL — SIGNIFICANT CHANGE UP (ref 1.6–2.6)
MCHC RBC-ENTMCNC: 34.4 PG — HIGH (ref 27–31)
MCHC RBC-ENTMCNC: 37.2 G/DL — HIGH (ref 32–36)
MCV RBC AUTO: 92.4 FL — SIGNIFICANT CHANGE UP (ref 80–94)
MONOCYTES NFR BLD AUTO: 18 % — HIGH (ref 3–10)
NEUTROPHILS NFR BLD AUTO: 61 % — SIGNIFICANT CHANGE UP (ref 37–73)
NEUTS BAND # BLD: 1 % — SIGNIFICANT CHANGE UP (ref 0–8)
OVALOCYTES BLD QL SMEAR: SLIGHT — SIGNIFICANT CHANGE UP
PHOSPHATE SERPL-MCNC: 4 MG/DL — SIGNIFICANT CHANGE UP (ref 2.4–4.7)
PLAT MORPH BLD: NORMAL — SIGNIFICANT CHANGE UP
PLATELET # BLD AUTO: 238 K/UL — SIGNIFICANT CHANGE UP (ref 150–400)
POIKILOCYTOSIS BLD QL AUTO: SLIGHT — SIGNIFICANT CHANGE UP
POTASSIUM SERPL-MCNC: 4.9 MMOL/L — SIGNIFICANT CHANGE UP (ref 3.5–5.3)
POTASSIUM SERPL-SCNC: 4.9 MMOL/L — SIGNIFICANT CHANGE UP (ref 3.5–5.3)
PROT SERPL-MCNC: 5.9 G/DL — LOW (ref 6.6–8.7)
RBC # BLD: 3.55 M/UL — LOW (ref 4.6–6.2)
RBC # FLD: 20.3 % — HIGH (ref 11–15.6)
RBC BLD AUTO: ABNORMAL
SODIUM SERPL-SCNC: 136 MMOL/L — SIGNIFICANT CHANGE UP (ref 135–145)
WBC # BLD: 7.7 K/UL — SIGNIFICANT CHANGE UP (ref 4.8–10.8)
WBC # FLD AUTO: 7.7 K/UL — SIGNIFICANT CHANGE UP (ref 4.8–10.8)

## 2018-04-24 PROCEDURE — 85027 COMPLETE CBC AUTOMATED: CPT

## 2018-04-24 PROCEDURE — 93005 ELECTROCARDIOGRAM TRACING: CPT

## 2018-04-24 PROCEDURE — 88342 IMHCHEM/IMCYTCHM 1ST ANTB: CPT

## 2018-04-24 PROCEDURE — 36415 COLL VENOUS BLD VENIPUNCTURE: CPT

## 2018-04-24 PROCEDURE — 80061 LIPID PANEL: CPT

## 2018-04-24 PROCEDURE — 88173 CYTOPATH EVAL FNA REPORT: CPT

## 2018-04-24 PROCEDURE — 88173 CYTOPATH EVAL FNA REPORT: CPT | Mod: 26

## 2018-04-24 PROCEDURE — 99233 SBSQ HOSP IP/OBS HIGH 50: CPT

## 2018-04-24 PROCEDURE — 83735 ASSAY OF MAGNESIUM: CPT

## 2018-04-24 PROCEDURE — 80048 BASIC METABOLIC PNL TOTAL CA: CPT

## 2018-04-24 PROCEDURE — 88305 TISSUE EXAM BY PATHOLOGIST: CPT

## 2018-04-24 PROCEDURE — 85610 PROTHROMBIN TIME: CPT

## 2018-04-24 PROCEDURE — 93306 TTE W/DOPPLER COMPLETE: CPT

## 2018-04-24 PROCEDURE — C1876: CPT

## 2018-04-24 PROCEDURE — 88172 CYTP DX EVAL FNA 1ST EA SITE: CPT

## 2018-04-24 PROCEDURE — 88305 TISSUE EXAM BY PATHOLOGIST: CPT | Mod: 26

## 2018-04-24 PROCEDURE — 85730 THROMBOPLASTIN TIME PARTIAL: CPT

## 2018-04-24 PROCEDURE — C1769: CPT

## 2018-04-24 PROCEDURE — 82378 CARCINOEMBRYONIC ANTIGEN: CPT

## 2018-04-24 PROCEDURE — 74330 X-RAY BILE/PANC ENDOSCOPY: CPT

## 2018-04-24 PROCEDURE — 84100 ASSAY OF PHOSPHORUS: CPT

## 2018-04-24 PROCEDURE — C2617: CPT

## 2018-04-24 PROCEDURE — 80076 HEPATIC FUNCTION PANEL: CPT

## 2018-04-24 PROCEDURE — 80053 COMPREHEN METABOLIC PANEL: CPT

## 2018-04-24 PROCEDURE — 99239 HOSP IP/OBS DSCHRG MGMT >30: CPT

## 2018-04-24 PROCEDURE — 74183 MRI ABD W/O CNTR FLWD CNTR: CPT

## 2018-04-24 PROCEDURE — C1889: CPT

## 2018-04-24 PROCEDURE — 76705 ECHO EXAM OF ABDOMEN: CPT

## 2018-04-24 PROCEDURE — 99285 EMERGENCY DEPT VISIT HI MDM: CPT

## 2018-04-24 PROCEDURE — 86301 IMMUNOASSAY TUMOR CA 19-9: CPT

## 2018-04-24 PROCEDURE — 88172 CYTP DX EVAL FNA 1ST EA SITE: CPT | Mod: 26

## 2018-04-24 RX ORDER — ATORVASTATIN CALCIUM 80 MG/1
1 TABLET, FILM COATED ORAL
Qty: 0 | Refills: 0 | COMMUNITY

## 2018-04-24 RX ORDER — DRONEDARONE 400 MG/1
1 TABLET, FILM COATED ORAL
Qty: 0 | Refills: 0 | COMMUNITY

## 2018-04-24 RX ORDER — PANTOPRAZOLE SODIUM 20 MG/1
1 TABLET, DELAYED RELEASE ORAL
Qty: 60 | Refills: 0 | OUTPATIENT
Start: 2018-04-24 | End: 2018-05-23

## 2018-04-24 RX ORDER — METOPROLOL TARTRATE 50 MG
1 TABLET ORAL
Qty: 30 | Refills: 0 | OUTPATIENT
Start: 2018-04-24 | End: 2018-05-23

## 2018-04-24 RX ADMIN — Medication 250 MILLIGRAM(S): at 06:12

## 2018-04-24 RX ADMIN — FINASTERIDE 5 MILLIGRAM(S): 5 TABLET, FILM COATED ORAL at 11:49

## 2018-04-24 RX ADMIN — Medication 1 MILLIGRAM(S): at 11:49

## 2018-04-24 RX ADMIN — TAMSULOSIN HYDROCHLORIDE 0.4 MILLIGRAM(S): 0.4 CAPSULE ORAL at 11:49

## 2018-04-24 RX ADMIN — Medication 5 MILLIGRAM(S): at 06:12

## 2018-04-24 RX ADMIN — Medication 25 MILLIGRAM(S): at 06:12

## 2018-04-24 NOTE — PROGRESS NOTE ADULT - SUBJECTIVE AND OBJECTIVE BOX
CARDIOLOGY PROGRESS NOTE   ([facility])                                                                                                        Subjective:   Laying in bed  Comfortable  NAD  wants to go home    Vitals:  T(C): 36.4 (04-24-18 @ 11:48), Max: 36.8 (04-23-18 @ 18:18)  HR: 56 (04-24-18 @ 14:22) (56 - 70)  BP: 122/66 (04-24-18 @ 14:22) (120/66 - 138/70)  RR: 18 (04-24-18 @ 11:48) (12 - 18)  SpO2: 96% (04-24-18 @ 11:48) (94% - 100%)  Wt(kg): --  I&O's Summary    23 Apr 2018 07:01  -  24 Apr 2018 07:00  --------------------------------------------------------  IN: 350 mL / OUT: 0 mL / NET: 350 mL          PHYSICAL EXAM:  Appearance: Normal	  HEENT:   Atraumatic  Cardiovascular: Normal S1 S2, No JVD, No murmurs, No edema  Respiratory: Lungs clear to auscultation	  Gastrointestinal:  Soft, Non-tender, + BS	  Skin: No rashes, No ecchymoses, No cyanosis  Neurologic: Alert and awake, able to move extremities  Extremities: No edema    TELEMETRY: 	    SR, HR mid 50s, pauses ~2 sec	      CURRENT MEDICATIONS:  enalapril 5 milliGRAM(s) Oral two times a day  metoprolol tartrate 25 milliGRAM(s) Oral every 8 hours  tamsulosin 0.4 milliGRAM(s) Oral daily            finasteride 5 milliGRAM(s) Oral daily    folic acid 1 milliGRAM(s) Oral daily      LABS:	 	                          10.5   7.7   )-----------( 238      ( 24 Apr 2018 07:10 )             28.5     04-24    136  |  100  |  26.0<H>  ----------------------------<  179<H>  4.9   |  22.0  |  <0.20<L>    Ca    9.2      24 Apr 2018 07:10  Phos  4.0     04-24  Mg     2.2     04-24    TPro  5.9<L>  /  Alb  3.1<L>  /  TBili  24.4<H>  /  DBili  >10.0<H>  /  AST  134<H>  /  ALT  122<H>  /  AlkPhos  537<H>  04-24    proBNP:   Lipid Profile: Date: 04-21 @ 06:14  Total cholesterol 126; Direct LDL: 82; HDL: 10; Triglycerides:168

## 2018-04-24 NOTE — DISCHARGE NOTE ADULT - HOSPITAL COURSE
89 yo M with h/o HTN, CAD, TAVR (bioprosthetic), anemia, BPH here with painless jaundice and new pancreatic lesion, s/p ERCP showed Severe intra-and extrahepatic biliary dilation and severe pancreatic duct dilation, likely produced by an approximately 15 mm mass within the superior aspect of the pancreatic head, seen by GI, schedule for ERCP on Monday as he has to off Plavix for 5 days     A/P    >Jaundice due to pancreatic mass   appreciate GI input - s/p ERCP/ EUS-s/p stent in pancreatic & CDB, resume plavix per GI   CEA 5.3 and CA 19-9 791 -  cardiology consult appreciated - TTE reviewed  Pt refusing to stay further, is agitated "I dont need home care, I can take care of myself", wants to go home, refusing home care or TONY. Okay with GI for discharge with f/u as outpatient for biopsy results.     >CAD s/p remote stent   holding plavix for now  stop cardizem as liver metabolized, c/w enalapril   holding statin due to abnormal LFT, f/u lipid profile   cardiology consult appreciated     >S/p TAVR     >PAF - stop Multaq, cardizem as per EPS, lopressor added, pt with pauses < 3secs today, missed beats, PVCs & SB 50-60's, Dr. Neumann made aware, suggested to reduce BB dose to 20 qD, Okay for d/c if pt not agreeable to stay. NO further NSVT  not on AC    >HTN - normotensive   c/w home medication    >BPH - c/w home medication     >DVT - scd  PHYSICAL EXAM:    GENERAL: Jaundiced   HEENT: EOMI  Neck: supple  CHEST/LUNG: CTA b/l   HEART: S1S2+ audible  ABDOMEN: Soft, Nontender, Nondistended; Bowel sounds present  EXTREMITIES:  no edema  CNS: AAO X 3  Psychiatry: agitated    VSS> Medically stable for d/c  Son insists that his father needs TONY or help at home but pt is AAO x 3 and is refusing TONY or home care despite multiple attemps to offer help 89 yo M with h/o HTN, CAD, TAVR (bioprosthetic), anemia, BPH here with painless jaundice and new pancreatic lesion, s/p ERCP showed Severe intra-and extrahepatic biliary dilation and severe pancreatic duct dilation, likely produced by an approximately 15 mm mass within the superior aspect of the pancreatic head, seen by GI, schedule for ERCP on Monday as he has to off Plavix for 5 days     A/P    >Jaundice due to pancreatic mass   appreciate GI input - s/p ERCP/ EUS-s/p stent in pancreatic & CDB,  pancreatic duct cannulation-5 Fr 3 cm single pig tail PD stent placement  Cholangiogram revealing 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 Okay with GI for discharge with f/u as outpatient for biopsy results.     >CAD s/p remote stent   holding plavix for now  stop cardizem as liver metabolized, c/w enalapril   holding statin due to abnormal LFT, f/u lipid profile   cardiology consult appreciated     >S/p TAVR     >PAF - stop Multaq, cardizem as per EPS, lopressor added, pt with pauses < 3secs today, missed beats, PVCs & SB 50-60's, Dr. Neumann made aware, suggested to reduce BB dose to 20 qD, Okay for d/c if pt not agreeable to stay. NO further NSVT  not on AC    >HTN - normotensive   c/w home medication    >BPH - c/w home medication     >DVT - scd  PHYSICAL EXAM:    GENERAL: Jaundiced   HEENT: EOMI  Neck: supple  CHEST/LUNG: CTA b/l   HEART: S1S2+ audible  ABDOMEN: Soft, Nontender, Nondistended; Bowel sounds present  EXTREMITIES:  no edema  CNS: AAO X 3  Psychiatry: agitated    VSS. Medically stable for d/c  Son insists that his father needs TONY or help at home but pt is AAO x 3 and is refusing TONY or home care despite multiple attemps to offer help

## 2018-04-24 NOTE — DISCHARGE NOTE ADULT - CARE PROVIDER_API CALL
Alexis Richards), Gastroenterology; Internal Medicine  [address] [place] [address]  Phone: [phone]  Fax: [phone]    Arcadio Verdin), Cardiovascular Disease  [address] [place] [address]  Phone: [phone]  Fax: [phone]    Brendon Neumann (MD; MPH), Cardiac Electrophysiology; Cardiovascular Disease; Internal Medicine  [address] [place] [id number]  Phone: [phone]  Fax: [phone]

## 2018-04-24 NOTE — DIETITIAN INITIAL EVALUATION ADULT. - ETIOLOGY
related to jaundice secondary to pancreatic mass related to secondary to pancreatic mass, bile duct stricture, malignant neoplasm

## 2018-04-24 NOTE — DISCHARGE NOTE ADULT - SECONDARY DIAGNOSIS.
BPH (benign prostatic hyperplasia) CAD (coronary artery disease) PAF (paroxysmal atrial fibrillation) NSVT (nonsustained ventricular tachycardia) HTN (hypertension)

## 2018-04-24 NOTE — PROGRESS NOTE ADULT - ASSESSMENT
4 year old male patient seen in PACU with a history of HTN, CAD (remote stent), TAVR (2 years ago [employer] valve), paroxysmal atrial fibrillation, anemia, BPH with telemetry evidence of PVCs. EF 60-65%. PVC on EKG.  who was transferred from [facility] for newly found pancreatic mass necessitating EUS/ERCP. Pt already had  ERCP and EGD. 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      EP input appreciated.  d/c'ed Cardizem  d/c'ed Multaq    Observed on tele given 1st AVB and LBBB  Started on Lopressor 25mg po q 8  with hold parameters; however he had pauses and bradycardia EP recommended lower dose BB.  Outpatient [facility] to assess for PVC burden and efficacy of BB rxn  in additional to occult conduction system worsening .Agree with d/c planning.   Out pt FU with cardiology and EP recommended.
89 yo M with h/o HTN, CAD, TAVR (bioprosthetic), anemia, BPH here with painless jaundice and new pancreatic lesion, s/p ERCP showed Severe intra-and extrahepatic biliary dilation and severe pancreatic duct dilation, likely produced by an approximately 15 mm mass within the superior aspect of the pancreatic head, seen by GI, schedule for ERCP on Monday as he has to off Plavix for 5 days     A/P    >Jaundice due to pancreatic mass   appreciate GI input - ERCP/ EUS today- held plavix for 5 days    Invanz ordered for cholangitis prophylaxsis pending ERCP with biliary stenting Monday.  INR 1.0 after getting vitamin K IV  f/u CEA 5.3 and CA 19-9 791 - elevated   cardiology consult appreciated - TTE reviewed  consider oncology after ERCP     >CAD s/p remote stent   holding plavix for now  c/w  cardizem, enalapril   holding statin due to abnormal LFT, f/u lipid profile   cardiology consult appreciated     >S/p TAVR     >PAF - c/w Multaq, cardizem   not on AC    >NSVT- 2 episodes of NSVT yesterday. No chest pain, palpitations, sob, light headedness/dizziness at that time. [employer] wnl. pt is on Multaq. QTC okay. Cardio notified. C/w tele monitor    >HTN - normotensive   c/w home medication    >BPH - c/w home medication     >DVT - scd
89 yo M with h/o HTN, CAD, TAVR (bioprosthetic), anemia, BPH here with painless jaundice and new pancreatic lesion, s/p ERCP showed Severe intra-and extrahepatic biliary dilation and severe pancreatic duct dilation, likely produced by an approximately 15 mm mass within the superior aspect of the pancreatic head, seen by GI, schedule for ERCP on Monday as he has to off Plavix for 5 days     A/P    >Jaundice due to pancreatic mass   appreciate GI input - ERCP/ EUS tomorrow, NPO after mn on Sun- hold plavix for 5 days    Invanz ordered for cholangitis prophylaxsis pending ERCP with biliary stenting Monday.  INR 1.0 after getting vitamin K IV  f/u CEA 5.3 and CA 19-9 791 - elevated   cardiology consult appreciated - TTE reviewed  consider oncology after ERCP     >CAD s/p remote stent   holding plavix for now  c/w  cardizem, enalapril   holding statin due to abnormal LFT, f/u lipid profile   cardiology consult appreciated     >S/p TAVR     >PAF - c/w Multaq, cardizem   not on AC    >NSVT- 2 episodes of NSVT today. No chest pain, palpitations, sob, light headedness/dizziness at that time. [employer] wnl. Will cehck EKG for QTC since pt is on Multaq. Cardio Dr. Verdin notified    >HTN - normotensive   c/w home medication    >BPH - c/w home medication     >DVT - scd
89 yo M with h/o HTN, CAD, TAVR, anemia, BPH here with painless jaundice and new pancreatic lesion, s/p ERCP showed Severe intra-and extrahepatic biliary dilation and severe pancreatic duct dilation, likely produced by an approximately 15 mm mass within the superior aspect of the pancreatic head, seen by GI, schedule for ERCP on Monday as he has to off Plavix for 5 days     A/P    >Jaundice due to pancreatic mass   appreciate GI input - need ERCP - hold plavix for 5 days   INR 1.38  f/u CEA 5.3 and CA 19-9 791 - elevated   cardiology consult appreciated - TTE pending   consider oncology after ERCP     >CAD s/p remote stent   holding plavix for now  c/w  cardizem, enelapril   holding statin due to abnormal LFT, f/u lipid profile   cardiology consult appreciated     >S/p TAVR     >PAF - c/w Multaq, cardizem   not on AC    >HTN - normotensive   c/w home medication    >BPH - c/w home medication     >DVT - scd
89 yo M with h/o HTN, CAD, TAVR, anemia, BPH here with painless jaundice and new pancreatic lesion, s/p ERCP showed Severe intra-and extrahepatic biliary dilation and severe pancreatic duct dilation, likely produced by an approximately 15 mm mass within the superior aspect of the pancreatic head, seen by GI, schedule for ERCP.    A/P    >Jaundice due to pancreatic mass   appreciate GI input - need ERCP - holding plavix  c/w vitamin K,  f/u INR   f/u CEA and CA 19-9  defer further management as per GI     >CAD s/p remote stent   holding plavix for now  c/w statin, cardizem, enelapril   cardiology consult requested for clearance     >S/p TAVR     >PAF - c/w Multaq, cardizem   not on AC    >HTN - normotensive   c/w home medication    >BPH - c/w home medication     >DVT - scd
89M hx HTN, CAD, PCI, stents several years ago, TAVR 2 years ago with resultant aortic regurgitation which is treated medically with baseline and by paravalvular plugging  PAZ unchanged, PAF on Multaq, Cardizem (not on AC) who was foubnd to have painless juandice and new pancreatic lesion, s/p ERCP showed Severe intra-and extrahepatic biliary dilation and severe pancreatic duct dilation, likely produced by an approximately 15 mm mass within the superior aspect of the pancreatic head, seen by GI, schedule for ERCP.  necessitating EUS/ERCP and transferred from  [facility] for newly found pancreatic mass necessitating EUS/ERCP.   Denied fever, chills, orthopnea, PND, edema, CP, palpitation, nausea, vomiting, hematuria, melena syncope, near syncope.  (PCP [facility]  which I called a, office is closed)      Preoperative cardiovascular examination    Echo is normal; normal bioprothestic valve in aortic position with mild to moderate AR.  pt is optimized for the planned procedure from cardiac standpoint    Jaundice due to pancreatic mass    Holding plavix for 5 days  c/w vitamin K,  f/u INR     CAD s/p remote stent   Denied anginal symtpoms    S/p TAVR h/o  residual AR       PAF   Cont telemetry  c/w Multaq, Cardizem   not on AC
89 yo M with h/o HTN, CAD, TAVR (bioprosthetic), anemia, BPH here with painless jaundice and new pancreatic lesion, s/p ERCP showed Severe intra-and extrahepatic biliary dilation and severe pancreatic duct dilation, likely produced by an approximately 15 mm mass within the superior aspect of the pancreatic head, seen by GI, schedule for ERCP on Monday as he has to off Plavix for 5 days     A/P    >Jaundice due to pancreatic mass   appreciate GI input - ERCP on monday, NPO after mn on Sun- hold plavix for 5 days    Invanz ordered for cholangitis prophylaxsis pending ERCP with biliary stenting Monday.  INR 1.38, getting vitamin K IV per GI, f/u INR  f/u CEA 5.3 and CA 19-9 791 - elevated   cardiology consult appreciated - TTE reviewed  consider oncology after ERCP     >CAD s/p remote stent   holding plavix for now  c/w  cardizem, enalapril   holding statin due to abnormal LFT, f/u lipid profile   cardiology consult appreciated     >S/p TAVR     >PAF - c/w Multaq, cardizem   not on AC    >HTN - normotensive   c/w home medication    >BPH - c/w home medication     >DVT - scd
Obstructive jaundice due to mass in HOP.  Aspirin and Plavix on hold.  Scheduled for ERCP for Monday.    Please call Cardiology for eval/ clearance.  Has LBBB . nsr on EKG and AOVR.  ? Echo.  Follow BW and coags.
POD #1/ s/p GETA. A&Ox3/VSS. Denies any throat pain, dysphagia, or hoarseness. No N/V or HA. Pain controlled. No quest. or complaints r/t cara LEO
89M hx HTN, CAD, PCI, stents several years ago, TAVR 2 years ago with resultant aortic regurgitation which is treated medically with baseline   PAZ unchanged, PAF on Multaq, Cardizem (not on AC) who was foubnd to have painless juandice and new pancreatic lesion, s/p ERCP showed Severe intra-and extrahepatic biliary dilation and severe pancreatic duct dilation, likely produced by an approximately 15 mm mass within the superior aspect of the pancreatic head, seen by GI, schedule for ERCP.  necessitating EUS/ERCP and transferred from  [facility] for newly found pancreatic mass necessitating EUS/ERCP.   Denied fever, chills, orthopnea, PND, edema, CP, palpitation, nausea, vomiting, hematuria, melena syncope, near syncope.  (PCP [facility]  which I called a, office is closed)      Preoperative cardiovascular examination  Denied CP, reasonable exercise tolerance  EKG:Diagnosis Line Sinus rhythm with 1st degree A-V block with occasional Premature ventricular complexes , Left axis deviation, Left bundle branch block (Unvhanged)  Echo Pending: If EF is normal; AVR is normal function pt is optimized for the planned procedure from cardiac standpoint    Jaundice due to pancreatic mass    appreciate GI input - need ERCP  Holding plavix for 5 days  c/w vitamin K,  f/u INR     CAD s/p remote stent   Denied anginal symtpoms    S/p TAVR h/o  residual AR   Echo planned    PAF   Cont telemetry  c/w Multaq, Cardizem   not on AC    HTN: Monitor BP
Patient with obstructive jaundice, elevated Ca 19-9, pancreatic head mass, distal CBD stricture. On EUS , mass in close proximity to portal vein. No encasement noted.     1. Monitor LFTs  2. Advance diet  3. If patient is interested, call surgical oncology consultation  4. Can be discharged   5. Call us in 2 weeks for EUS/FNA/brushings results  6. Can start antiplatelets today

## 2018-04-24 NOTE — DISCHARGE NOTE ADULT - MEDICATION SUMMARY - MEDICATIONS TO STOP TAKING
I will STOP taking the medications listed below when I get home from the hospital:    Cardizem 120 mg oral tablet  --  by mouth    Multaq 400 mg oral tablet  -- 1 tab(s) by mouth once a day

## 2018-04-24 NOTE — DISCHARGE NOTE ADULT - PATIENT PORTAL LINK FT
You can access the [facility] Patient Portal, offered by [facility], by registering with the following website: [email or web address]

## 2018-04-24 NOTE — PROGRESS NOTE ADULT - SUBJECTIVE AND OBJECTIVE BOX
Anesthesia Post Op Note:      INTERVAL HPI/OVERNIGHT EVENTS:    MEDICATIONS  (STANDING):  enalapril 5 milliGRAM(s) Oral two times a day  finasteride 5 milliGRAM(s) Oral daily  folic acid 1 milliGRAM(s) Oral daily  metoprolol tartrate 25 milliGRAM(s) Oral every 8 hours  tamsulosin 0.4 milliGRAM(s) Oral daily    MEDICATIONS  (PRN):      Allergies    No Known Allergies    Intolerances        REVIEW OF SYSTEMS:    CONSTITUTIONAL: No fever, weight loss, or fatigue  EYES: No eye pain, visual disturbances, or discharge  ENMT:  No difficulty hearing, tinnitus, vertigo; No sinus or throat pain  NECK: No pain or stiffness  BREASTS: No pain, masses, or nipple discharge  RESPIRATORY: No cough, wheezing, chills or hemoptysis; No shortness of breath  CARDIOVASCULAR: No chest pain, palpitations, dizziness, or leg swelling  GASTROINTESTINAL: No abdominal or epigastric pain. No nausea, vomiting, or hematemesis; No diarrhea or constipation. No melena or hematochezia.  GENITOURINARY: No dysuria, frequency, hematuria, or incontinence  NEUROLOGICAL: No headaches, memory loss, loss of strength, numbness, or tremors  SKIN: No itching, burning, rashes, or lesions   LYMPH NODES: No enlarged glands  ENDOCRINE: No heat or cold intolerance; No hair loss  MUSCULOSKELETAL: No joint pain or swelling; No muscle, back, or extremity pain  PSYCHIATRIC: No depression, anxiety, mood swings, or difficulty sleeping  HEME/LYMPH: No easy bruising, or bleeding gums  ALLERY AND IMMUNOLOGIC: No hives or eczema    Vital Signs Last 24 Hrs  T(C): 36.4 (24 Apr 2018 11:48), Max: 36.8 (23 Apr 2018 18:18)  T(F): 97.6 (24 Apr 2018 11:48), Max: 98.3 (23 Apr 2018 18:18)  HR: 56 (24 Apr 2018 14:22) (56 - 70)  BP: 122/66 (24 Apr 2018 14:22) (120/66 - 140/72)  BP(mean): --  RR: 18 (24 Apr 2018 11:48) (11 - 18)  SpO2: 96% (24 Apr 2018 11:48) (94% - 100%)        PHYSICAL EXAM:    GENERAL: NAD, well-groomed, well-developed  HEAD:  Atraumatic, Normocephalic  EYES: EOMI, PERRLA, conjunctiva and sclera clear  ENMT: No tonsillar erythema, exudates, or enlargement; Moist mucous membranes, Good dentition, No lesions  NECK: Supple, No JVD, Normal thyroid  NERVOUS SYSTEM:  Alert & Oriented X3, Good concentration; Motor Strength 5/5 B/L upper and lower extremities; DTRs 2+ intact and symmetric  CHEST/LUNG: Clear to percussion bilaterally; No rales, rhonchi, wheezing, or rubs  HEART: Regular rate and rhythm; No murmurs, rubs, or gallops  ABDOMEN: Soft, Nontender, Nondistended; Bowel sounds present  EXTREMITIES:  2+ Peripheral Pulses, No clubbing, cyanosis, or edema  LYMPH: No lymphadenopathy noted  SKIN: No rashes or lesions      LABS:                        10.5   7.7   )-----------( 238      ( 24 Apr 2018 07:10 )             28.5     04-24    136  |  100  |  26.0<H>  ----------------------------<  179<H>  4.9   |  22.0  |  <0.20<L>    Ca    9.2      24 Apr 2018 07:10  Phos  4.0     04-24  Mg     2.2     04-24    TPro  5.9<L>  /  Alb  3.1<L>  /  TBili  24.4<H>  /  DBili  >10.0<H>  /  AST  134<H>  /  ALT  122<H>  /  AlkPhos  537<H>  04-24    PT/INR - ( 23 Apr 2018 06:14 )   PT: 12.1 sec;   INR: 1.10 ratio         PTT - ( 23 Apr 2018 06:14 )  PTT:33.2 sec      RADIOLOGY & ADDITIONAL TESTS:

## 2018-04-24 NOTE — DISCHARGE NOTE ADULT - CARE PLAN
Principal Discharge DX:	Pancreatic mass  Goal:	symptom resolution  Assessment and plan of treatment:	Follow up with gastroenterology within 1- 2 week of discharge  Secondary Diagnosis:	BPH (benign prostatic hyperplasia)  Assessment and plan of treatment:	Continue with home meds as directed. Follow up with your primary doctor within 1 week of discharge  Secondary Diagnosis:	CAD (coronary artery disease)  Assessment and plan of treatment:	Continue with home meds as directed. Follow up with your primary doctor within 1 week of discharge  Secondary Diagnosis:	PAF (paroxysmal atrial fibrillation)  Assessment and plan of treatment:	Stop cardizem & multaq. Continue with rest of home meds as directed. Follow up with your primary doctor & cardiology within 1 week of discharge  Secondary Diagnosis:	NSVT (nonsustained ventricular tachycardia)  Assessment and plan of treatment:	Stop cardizem & multaq. Continue with rest of home meds as directed. Follow up with your primary doctor & cardiology within 1 week of discharge  Secondary Diagnosis:	HTN (hypertension)  Assessment and plan of treatment:	Continue with home meds as directed. Follow up with your primary doctor within 1 week of discharge Principal Discharge DX:	Pancreatic mass  Goal:	symptom resolution  Assessment and plan of treatment:	Follow up with gastroenterology within 1- 2 week of discharge  Secondary Diagnosis:	BPH (benign prostatic hyperplasia)  Assessment and plan of treatment:	Continue with home meds as directed. Follow up with your primary doctor within 1 week of discharge  Secondary Diagnosis:	CAD (coronary artery disease)  Assessment and plan of treatment:	Continue with home meds as directed. Follow up with your primary doctor within 1 week of discharge  Secondary Diagnosis:	PAF (paroxysmal atrial fibrillation)  Assessment and plan of treatment:	Stop cardizem & multaq. Continue with rest of home meds as directed. Follow up with your primary doctor & cardiology within 1 week of discharge  Secondary Diagnosis:	NSVT (nonsustained ventricular tachycardia)  Assessment and plan of treatment:	Stop cardizem & multaq. Continue with rest of home meds as directed. Follow up with your primary doctor, EPS & cardiology. Outpatient [facility] to assess for PVC burden and efficacy of BB rxn  in additional to occult conduction system worsening  Secondary Diagnosis:	HTN (hypertension)  Assessment and plan of treatment:	Continue with home meds as directed. Follow up with your primary doctor within 1 week of discharge

## 2018-04-24 NOTE — PROGRESS NOTE ADULT - PROVIDER SPECIALTY LIST ADULT
Anesthesia
Cardiology
Gastroenterology
Hospitalist
Gastroenterology

## 2018-04-24 NOTE — DISCHARGE NOTE ADULT - PLAN OF CARE
symptom resolution Follow up with gastroenterology within 1- 2 week of discharge Continue with home meds as directed. Follow up with your primary doctor within 1 week of discharge Stop cardizem & multaq. Continue with rest of home meds as directed. Follow up with your primary doctor & cardiology within 1 week of discharge Stop cardizem & multaq. Continue with rest of home meds as directed. Follow up with your primary doctor, EPS & cardiology. Outpatient [facility] to assess for PVC burden and efficacy of BB rxn  in additional to occult conduction system worsening

## 2018-04-24 NOTE — DIETITIAN INITIAL EVALUATION ADULT. - SIGNS/SYMPTOMS
as evidenced by BUN (26), Creat (<2.0), ALT (122), AST (134) Pro (5.9), Alb (3.1) as evidenced by BUN (26), Creat (<2.0), ALT (122), AST (134) Pro (5.9), Alb (3.1), high bilirubin

## 2018-04-24 NOTE — DIETITIAN INITIAL EVALUATION ADULT. - OTHER INFO
PTA pt states appetite as good. He states never eating lunch PTA. Noted untouched lunch tray at time of interview but pt said he will eat about half of it. Pt reports no changes in appetite since admission. Chart notes states pt completed 100% of meals. Denies N/V/D, swallowing and chewing issues. Pt states wt has been stable over the past 6 mo and no significant wt loss noted.

## 2018-04-24 NOTE — PROGRESS NOTE ADULT - SUBJECTIVE AND OBJECTIVE BOX
INTERVAL HPI/OVERNIGHT EVENTS:Patient FU for pancreatic head mass, obstructive jaundice. s/p EUS with FNA and ERCP with bile and pancreatic duct stent placement. Patient doing well except sore throat. ? arrhythmia post procedure. Evaluated by cardiology. Wants to know when can be discharged home.    MEDICATIONS  (STANDING):  enalapril 5 milliGRAM(s) Oral two times a day  ertapenem  IVPB 1000 milliGRAM(s) IV Intermittent every 24 hours  ertapenem  IVPB      finasteride 5 milliGRAM(s) Oral daily  folic acid 1 milliGRAM(s) Oral daily  metoprolol tartrate 25 milliGRAM(s) Oral every 8 hours  saccharomyces boulardii 250 milliGRAM(s) Oral two times a day  tamsulosin 0.4 milliGRAM(s) Oral daily    MEDICATIONS  (PRN):      Allergies    No Known Allergies    Intolerances        Vital Signs Last 24 Hrs  T(C): 36.6 (24 Apr 2018 05:40), Max: 36.8 (23 Apr 2018 18:18)  T(F): 97.9 (24 Apr 2018 05:40), Max: 98.3 (23 Apr 2018 18:18)  HR: 65 (24 Apr 2018 05:40) (60 - 77)  BP: 120/66 (24 Apr 2018 05:40) (112/62 - 147/72)  BP(mean): --  RR: 18 (24 Apr 2018 05:40) (11 - 18)  SpO2: 95% (24 Apr 2018 05:40) (94% - 100%)    LABS:                        10.9   8.1   )-----------( 231      ( 23 Apr 2018 06:14 )             31.0     04-23    137  |  103  |  19.0  ----------------------------<  111  4.0   |  21.0<L>  |  <0.20<L>    Ca    8.8      23 Apr 2018 06:14  Phos  3.2     04-23  Mg     2.1     04-23    TPro  5.5<L>  /  Alb  3.1<L>  /  TBili  24.5<H>  /  DBili  x   /  AST  134<H>  /  ALT  129<H>  /  AlkPhos  512<H>  04-23    PT/INR - ( 23 Apr 2018 06:14 )   PT: 12.1 sec;   INR: 1.10 ratio         PTT - ( 23 Apr 2018 06:14 )  PTT:33.2 sec    Cancer Antigen, GI Ca 19-9 (04.19.18 @ 20:20)    Cancer Antigen, GI Ca 19-9: 791.1: METHOD: Syncbak Chemiluminescent Immunoassay  Values obtained with different assay methods or kits cannot be used  interchangeably.  Results cannot be interpreted as absolute evidence of the presence or  absence of malignant disease. U/mL      RADIOLOGY & ADDITIONAL TESTS:

## 2018-04-24 NOTE — DISCHARGE NOTE ADULT - CARE PROVIDERS DIRECT ADDRESSES
,[email or web address],[email or web address]

## 2018-04-27 LAB — SURGICAL PATHOLOGY FINAL REPORT - CH: SIGNIFICANT CHANGE UP

## 2018-04-30 LAB — CYTOLOGY FNA REPORT: SIGNIFICANT CHANGE UP

## 2018-05-01 NOTE — CDI QUERY NOTE - NSCDIOTHERTXTBX_GEN_ALL_CORE_HH
Pathology Report:    Pt admitted with Pancreatic Mass and Jaundice.   Underwent ERCP/EGD      4/24 Cytopathology Report: Final Diagnosis   MASS, PANCREATIC HEAD, EUS (SMEARS, THIN PREP AND CELL BLOCK):   -POSITIVE FOR MALIGNANT CELLS. -C/W ADENOCARCINOMA     4/23 Surgical Pathology Report: Common biliary duct-CBD, brushings:   - Positive for malignant cells, consistent with Adenocarcinoma.      Please acknowledge the above Pathology Report Findings in an addendum to your D/C note.

## 2018-05-02 ENCOUNTER — APPOINTMENT (OUTPATIENT)
Dept: GASTROENTEROLOGY | Facility: CLINIC | Age: 83
End: 2018-05-02
Payer: MEDICARE

## 2018-05-02 VITALS
SYSTOLIC BLOOD PRESSURE: 153 MMHG | BODY MASS INDEX: 27.9 KG/M2 | WEIGHT: 206 LBS | HEART RATE: 69 BPM | DIASTOLIC BLOOD PRESSURE: 61 MMHG | HEIGHT: 72 IN

## 2018-05-02 DIAGNOSIS — Z87.39 PERSONAL HISTORY OF OTHER DISEASES OF THE MUSCULOSKELETAL SYSTEM AND CONNECTIVE TISSUE: ICD-10-CM

## 2018-05-02 DIAGNOSIS — Z86.39 PERSONAL HISTORY OF OTHER ENDOCRINE, NUTRITIONAL AND METABOLIC DISEASE: ICD-10-CM

## 2018-05-02 DIAGNOSIS — Z87.891 PERSONAL HISTORY OF NICOTINE DEPENDENCE: ICD-10-CM

## 2018-05-02 DIAGNOSIS — Z86.79 PERSONAL HISTORY OF OTHER DISEASES OF THE CIRCULATORY SYSTEM: ICD-10-CM

## 2018-05-02 PROCEDURE — 99215 OFFICE O/P EST HI 40 MIN: CPT

## 2018-05-02 RX ORDER — ATORVASTATIN CALCIUM 20 MG/1
20 TABLET, FILM COATED ORAL
Qty: 90 | Refills: 0 | Status: ACTIVE | COMMUNITY
Start: 2018-03-02

## 2018-05-02 RX ORDER — DRONEDARONE 400 MG/1
400 TABLET, FILM COATED ORAL
Qty: 90 | Refills: 0 | Status: ACTIVE | COMMUNITY
Start: 2018-03-09

## 2018-05-02 RX ORDER — DILTIAZEM HYDROCHLORIDE 120 MG/1
120 CAPSULE, EXTENDED RELEASE ORAL
Qty: 90 | Refills: 0 | Status: ACTIVE | COMMUNITY
Start: 2018-04-10

## 2018-05-02 RX ORDER — DILTIAZEM HYDROCHLORIDE 120 MG/1
120 CAPSULE, EXTENDED RELEASE ORAL
Qty: 90 | Refills: 0 | Status: ACTIVE | COMMUNITY
Start: 2018-01-10

## 2018-05-02 RX ORDER — TAMSULOSIN HYDROCHLORIDE 0.4 MG/1
0.4 CAPSULE ORAL
Qty: 90 | Refills: 0 | Status: ACTIVE | COMMUNITY
Start: 2018-04-28

## 2018-05-02 RX ORDER — METOPROLOL SUCCINATE 25 MG/1
25 TABLET, EXTENDED RELEASE ORAL
Qty: 30 | Refills: 0 | Status: ACTIVE | COMMUNITY
Start: 2018-04-24

## 2018-05-02 RX ORDER — PANTOPRAZOLE 40 MG/1
40 TABLET, DELAYED RELEASE ORAL
Qty: 60 | Refills: 0 | Status: ACTIVE | COMMUNITY
Start: 2018-04-24

## 2018-05-02 RX ORDER — FOLIC ACID 1 MG/1
1 TABLET ORAL
Qty: 30 | Refills: 0 | Status: ACTIVE | COMMUNITY
Start: 2018-01-03

## 2018-05-02 RX ORDER — CLOPIDOGREL BISULFATE 75 MG/1
75 TABLET, FILM COATED ORAL
Qty: 90 | Refills: 0 | Status: ACTIVE | COMMUNITY
Start: 2018-03-09

## 2018-05-02 RX ORDER — FINASTERIDE 5 MG/1
5 TABLET, FILM COATED ORAL
Qty: 90 | Refills: 0 | Status: ACTIVE | COMMUNITY
Start: 2018-01-09

## 2018-05-02 RX ORDER — ENALAPRIL MALEATE 5 MG/1
5 TABLET ORAL
Qty: 180 | Refills: 0 | Status: ACTIVE | COMMUNITY
Start: 2018-02-01

## 2018-05-05 ENCOUNTER — LABORATORY RESULT (OUTPATIENT)
Age: 83
End: 2018-05-05

## 2018-05-07 ENCOUNTER — APPOINTMENT (OUTPATIENT)
Dept: SURGICAL ONCOLOGY | Facility: CLINIC | Age: 83
End: 2018-05-07
Payer: MEDICARE

## 2018-05-07 VITALS
HEIGHT: 74 IN | RESPIRATION RATE: 16 BRPM | OXYGEN SATURATION: 95 % | DIASTOLIC BLOOD PRESSURE: 69 MMHG | HEART RATE: 79 BPM | WEIGHT: 206 LBS | BODY MASS INDEX: 26.44 KG/M2 | SYSTOLIC BLOOD PRESSURE: 137 MMHG

## 2018-05-07 DIAGNOSIS — Z86.2 PERSONAL HISTORY OF DISEASES OF THE BLOOD AND BLOOD-FORMING ORGANS AND CERTAIN DISORDERS INVOLVING THE IMMUNE MECHANISM: ICD-10-CM

## 2018-05-07 DIAGNOSIS — R06.9 UNSPECIFIED ABNORMALITIES OF BREATHING: ICD-10-CM

## 2018-05-07 DIAGNOSIS — M25.9 JOINT DISORDER, UNSPECIFIED: ICD-10-CM

## 2018-05-07 DIAGNOSIS — C25.9 MALIGNANT NEOPLASM OF PANCREAS, UNSPECIFIED: ICD-10-CM

## 2018-05-07 LAB
ALBUMIN SERPL ELPH-MCNC: 3.4 G/DL
ALP BLD-CCNC: 252 U/L
ALT SERPL-CCNC: 87 U/L
ANION GAP SERPL CALC-SCNC: 13 MMOL/L
AST SERPL-CCNC: 66 U/L
BASOPHILS # BLD AUTO: 0 K/UL
BASOPHILS NFR BLD AUTO: 0 %
BILIRUB SERPL-MCNC: 6.7 MG/DL
BUN SERPL-MCNC: 18 MG/DL
CALCIUM SERPL-MCNC: 9 MG/DL
CANCER AG19-9 SERPL-ACNC: 321.8 U/ML
CHLORIDE SERPL-SCNC: 104 MMOL/L
CO2 SERPL-SCNC: 24 MMOL/L
CREAT SERPL-MCNC: 0.91 MG/DL
EOSINOPHIL # BLD AUTO: 0 K/UL
EOSINOPHIL NFR BLD AUTO: 0 %
GLUCOSE SERPL-MCNC: 135 MG/DL
HCT VFR BLD CALC: 32.7 %
HGB BLD-MCNC: 10.7 G/DL
INR PPP: 1.11 RATIO
LYMPHOCYTES # BLD AUTO: 1.75 K/UL
LYMPHOCYTES NFR BLD AUTO: 17 %
MAN DIFF?: NORMAL
MCHC RBC-ENTMCNC: 32.7 GM/DL
MCHC RBC-ENTMCNC: 33.6 PG
MCV RBC AUTO: 102.8 FL
MONOCYTES # BLD AUTO: 0.82 K/UL
MONOCYTES NFR BLD AUTO: 8 %
NEUTROPHILS # BLD AUTO: 7.29 K/UL
NEUTROPHILS NFR BLD AUTO: 71 %
PLATELET # BLD AUTO: 198 K/UL
POTASSIUM SERPL-SCNC: 4.8 MMOL/L
PROT SERPL-MCNC: 6.1 G/DL
PT BLD: 12.6 SEC
RBC # BLD: 3.18 M/UL
RBC # FLD: 16.7 %
SODIUM SERPL-SCNC: 141 MMOL/L
WBC # FLD AUTO: 10.27 K/UL

## 2018-05-07 PROCEDURE — 99205 OFFICE O/P NEW HI 60 MIN: CPT

## 2018-05-15 ENCOUNTER — APPOINTMENT (OUTPATIENT)
Dept: SURGERY | Facility: CLINIC | Age: 83
End: 2018-05-15

## 2018-05-15 VITALS
DIASTOLIC BLOOD PRESSURE: 79 MMHG | HEART RATE: 86 BPM | OXYGEN SATURATION: 98 % | BODY MASS INDEX: 26.44 KG/M2 | SYSTOLIC BLOOD PRESSURE: 127 MMHG | HEIGHT: 74 IN | WEIGHT: 206 LBS | RESPIRATION RATE: 16 BRPM

## 2018-08-14 ENCOUNTER — INPATIENT (INPATIENT)
Facility: HOSPITAL | Age: 83
LOS: 2 days | Discharge: NURSING, REHAB OR LONG-TERM CARE | End: 2018-08-17
Attending: INTERNAL MEDICINE | Admitting: INTERNAL MEDICINE
Payer: MEDICARE

## 2018-08-14 VITALS
OXYGEN SATURATION: 96 % | HEIGHT: 68 IN | HEART RATE: 73 BPM | WEIGHT: 184.97 LBS | RESPIRATION RATE: 18 BRPM | DIASTOLIC BLOOD PRESSURE: 81 MMHG | TEMPERATURE: 98 F | SYSTOLIC BLOOD PRESSURE: 144 MMHG

## 2018-08-14 DIAGNOSIS — Z95.2 PRESENCE OF PROSTHETIC HEART VALVE: Chronic | ICD-10-CM

## 2018-08-14 PROBLEM — E78.00 PURE HYPERCHOLESTEROLEMIA, UNSPECIFIED: Chronic | Status: ACTIVE | Noted: 2018-04-18

## 2018-08-14 PROBLEM — I25.10 ATHEROSCLEROTIC HEART DISEASE OF NATIVE CORONARY ARTERY WITHOUT ANGINA PECTORIS: Chronic | Status: ACTIVE | Noted: 2017-03-24

## 2018-08-14 PROBLEM — N40.0 BENIGN PROSTATIC HYPERPLASIA WITHOUT LOWER URINARY TRACT SYMPTOMS: Chronic | Status: ACTIVE | Noted: 2018-04-18

## 2018-08-14 LAB
ALBUMIN SERPL ELPH-MCNC: 2.5 G/DL — LOW (ref 3.3–5)
ALP SERPL-CCNC: 86 U/L — SIGNIFICANT CHANGE UP (ref 40–120)
ALT FLD-CCNC: 28 U/L — SIGNIFICANT CHANGE UP (ref 12–78)
AMMONIA BLD-MCNC: 21 UMOL/L — SIGNIFICANT CHANGE UP (ref 11–32)
ANION GAP SERPL CALC-SCNC: 11 MMOL/L — SIGNIFICANT CHANGE UP (ref 5–17)
ANISOCYTOSIS BLD QL: SIGNIFICANT CHANGE UP
APPEARANCE UR: CLEAR — SIGNIFICANT CHANGE UP
AST SERPL-CCNC: 24 U/L — SIGNIFICANT CHANGE UP (ref 15–37)
BASOPHILS # BLD AUTO: 0 K/UL — SIGNIFICANT CHANGE UP (ref 0–0.2)
BASOPHILS NFR BLD AUTO: 0 % — SIGNIFICANT CHANGE UP (ref 0–2)
BILIRUB SERPL-MCNC: 1.2 MG/DL — SIGNIFICANT CHANGE UP (ref 0.2–1.2)
BILIRUB UR-MCNC: NEGATIVE — SIGNIFICANT CHANGE UP
BUN SERPL-MCNC: 14 MG/DL — SIGNIFICANT CHANGE UP (ref 7–23)
CALCIUM SERPL-MCNC: 8.4 MG/DL — LOW (ref 8.5–10.1)
CHLORIDE SERPL-SCNC: 107 MMOL/L — SIGNIFICANT CHANGE UP (ref 96–108)
CK SERPL-CCNC: 59 U/L — SIGNIFICANT CHANGE UP (ref 26–308)
CO2 SERPL-SCNC: 24 MMOL/L — SIGNIFICANT CHANGE UP (ref 22–31)
COLOR SPEC: YELLOW — SIGNIFICANT CHANGE UP
CREAT SERPL-MCNC: 0.96 MG/DL — SIGNIFICANT CHANGE UP (ref 0.5–1.3)
DACRYOCYTES BLD QL SMEAR: SLIGHT — SIGNIFICANT CHANGE UP
DIFF PNL FLD: NEGATIVE — SIGNIFICANT CHANGE UP
ELLIPTOCYTES BLD QL SMEAR: SLIGHT — SIGNIFICANT CHANGE UP
EOSINOPHIL # BLD AUTO: 0.09 K/UL — SIGNIFICANT CHANGE UP (ref 0–0.5)
EOSINOPHIL NFR BLD AUTO: 1 % — SIGNIFICANT CHANGE UP (ref 0–6)
GLUCOSE SERPL-MCNC: 111 MG/DL — HIGH (ref 70–99)
GLUCOSE UR QL: NEGATIVE MG/DL — SIGNIFICANT CHANGE UP
HCT VFR BLD CALC: 27.6 % — LOW (ref 39–50)
HGB BLD-MCNC: 8.9 G/DL — LOW (ref 13–17)
KETONES UR-MCNC: NEGATIVE — SIGNIFICANT CHANGE UP
LEUKOCYTE ESTERASE UR-ACNC: ABNORMAL
LYMPHOCYTES # BLD AUTO: 1.06 K/UL — SIGNIFICANT CHANGE UP (ref 1–3.3)
LYMPHOCYTES # BLD AUTO: 12 % — LOW (ref 13–44)
MACROCYTES BLD QL: SIGNIFICANT CHANGE UP
MANUAL SMEAR VERIFICATION: SIGNIFICANT CHANGE UP
MCHC RBC-ENTMCNC: 32.2 GM/DL — SIGNIFICANT CHANGE UP (ref 32–36)
MCHC RBC-ENTMCNC: 35.5 PG — HIGH (ref 27–34)
MCV RBC AUTO: 110 FL — HIGH (ref 80–100)
METAMYELOCYTES # FLD: 3 % — HIGH (ref 0–0)
MONOCYTES # BLD AUTO: 0.97 K/UL — HIGH (ref 0–0.9)
MONOCYTES NFR BLD AUTO: 11 % — SIGNIFICANT CHANGE UP (ref 2–14)
NEUTROPHILS # BLD AUTO: 6.38 K/UL — SIGNIFICANT CHANGE UP (ref 1.8–7.4)
NEUTROPHILS NFR BLD AUTO: 72 % — SIGNIFICANT CHANGE UP (ref 43–77)
NITRITE UR-MCNC: NEGATIVE — SIGNIFICANT CHANGE UP
NRBC # BLD: 0 /100 — SIGNIFICANT CHANGE UP (ref 0–0)
NRBC # BLD: SIGNIFICANT CHANGE UP /100 WBCS (ref 0–0)
NT-PROBNP SERPL-SCNC: 3097 PG/ML — HIGH (ref 0–450)
OVALOCYTES BLD QL SMEAR: SLIGHT — SIGNIFICANT CHANGE UP
PH UR: 6 — SIGNIFICANT CHANGE UP (ref 5–8)
PLAT MORPH BLD: NORMAL — SIGNIFICANT CHANGE UP
PLATELET # BLD AUTO: 147 K/UL — LOW (ref 150–400)
POIKILOCYTOSIS BLD QL AUTO: SLIGHT — SIGNIFICANT CHANGE UP
POLYCHROMASIA BLD QL SMEAR: SLIGHT — SIGNIFICANT CHANGE UP
POTASSIUM SERPL-MCNC: 3.9 MMOL/L — SIGNIFICANT CHANGE UP (ref 3.5–5.3)
POTASSIUM SERPL-SCNC: 3.9 MMOL/L — SIGNIFICANT CHANGE UP (ref 3.5–5.3)
PROT SERPL-MCNC: 6.1 GM/DL — SIGNIFICANT CHANGE UP (ref 6–8.3)
PROT UR-MCNC: 15 MG/DL
RBC # BLD: 2.51 M/UL — LOW (ref 4.2–5.8)
RBC # FLD: 20.5 % — HIGH (ref 10.3–14.5)
RBC BLD AUTO: ABNORMAL
RBC CASTS # UR COMP ASSIST: SIGNIFICANT CHANGE UP /HPF (ref 0–4)
SODIUM SERPL-SCNC: 142 MMOL/L — SIGNIFICANT CHANGE UP (ref 135–145)
SP GR SPEC: 1.01 — SIGNIFICANT CHANGE UP (ref 1.01–1.02)
TROPONIN I SERPL-MCNC: 0.04 NG/ML — SIGNIFICANT CHANGE UP (ref 0.01–0.04)
UROBILINOGEN FLD QL: 1 MG/DL
VARIANT LYMPHS # BLD: 1 % — SIGNIFICANT CHANGE UP (ref 0–6)
WBC # BLD: 8.86 K/UL — SIGNIFICANT CHANGE UP (ref 3.8–10.5)
WBC # FLD AUTO: 8.86 K/UL — SIGNIFICANT CHANGE UP (ref 3.8–10.5)
WBC UR QL: SIGNIFICANT CHANGE UP

## 2018-08-14 PROCEDURE — 93010 ELECTROCARDIOGRAM REPORT: CPT

## 2018-08-14 PROCEDURE — 71250 CT THORAX DX C-: CPT | Mod: 26

## 2018-08-14 PROCEDURE — 70450 CT HEAD/BRAIN W/O DYE: CPT | Mod: 26

## 2018-08-14 PROCEDURE — 99285 EMERGENCY DEPT VISIT HI MDM: CPT

## 2018-08-14 PROCEDURE — 71045 X-RAY EXAM CHEST 1 VIEW: CPT | Mod: 26

## 2018-08-14 RX ORDER — ASCORBIC ACID 60 MG
1 TABLET,CHEWABLE ORAL
Qty: 0 | Refills: 0 | COMMUNITY

## 2018-08-14 RX ORDER — ATORVASTATIN CALCIUM 80 MG/1
20 TABLET, FILM COATED ORAL AT BEDTIME
Qty: 0 | Refills: 0 | Status: DISCONTINUED | OUTPATIENT
Start: 2018-08-14 | End: 2018-08-17

## 2018-08-14 RX ORDER — DRONEDARONE 400 MG/1
1 TABLET, FILM COATED ORAL
Qty: 0 | Refills: 0 | COMMUNITY

## 2018-08-14 RX ORDER — SODIUM CHLORIDE 9 MG/ML
500 INJECTION INTRAMUSCULAR; INTRAVENOUS; SUBCUTANEOUS ONCE
Qty: 0 | Refills: 0 | Status: COMPLETED | OUTPATIENT
Start: 2018-08-14 | End: 2018-08-14

## 2018-08-14 RX ORDER — FINASTERIDE 5 MG/1
1 TABLET, FILM COATED ORAL
Qty: 0 | Refills: 0 | COMMUNITY

## 2018-08-14 RX ORDER — TAMSULOSIN HYDROCHLORIDE 0.4 MG/1
0.4 CAPSULE ORAL AT BEDTIME
Qty: 0 | Refills: 0 | Status: DISCONTINUED | OUTPATIENT
Start: 2018-08-14 | End: 2018-08-17

## 2018-08-14 RX ORDER — CLOPIDOGREL BISULFATE 75 MG/1
75 TABLET, FILM COATED ORAL DAILY
Qty: 0 | Refills: 0 | Status: DISCONTINUED | OUTPATIENT
Start: 2018-08-14 | End: 2018-08-17

## 2018-08-14 RX ORDER — PANTOPRAZOLE SODIUM 20 MG/1
40 TABLET, DELAYED RELEASE ORAL
Qty: 0 | Refills: 0 | Status: DISCONTINUED | OUTPATIENT
Start: 2018-08-14 | End: 2018-08-17

## 2018-08-14 RX ORDER — ASCORBIC ACID 60 MG
1000 TABLET,CHEWABLE ORAL DAILY
Qty: 0 | Refills: 0 | Status: DISCONTINUED | OUTPATIENT
Start: 2018-08-14 | End: 2018-08-17

## 2018-08-14 RX ORDER — CLOPIDOGREL BISULFATE 75 MG/1
1 TABLET, FILM COATED ORAL
Qty: 0 | Refills: 0 | COMMUNITY

## 2018-08-14 RX ORDER — FINASTERIDE 5 MG/1
5 TABLET, FILM COATED ORAL DAILY
Qty: 0 | Refills: 0 | Status: DISCONTINUED | OUTPATIENT
Start: 2018-08-14 | End: 2018-08-17

## 2018-08-14 RX ORDER — FOLIC ACID 0.8 MG
1 TABLET ORAL DAILY
Qty: 0 | Refills: 0 | Status: DISCONTINUED | OUTPATIENT
Start: 2018-08-14 | End: 2018-08-17

## 2018-08-14 RX ORDER — FUROSEMIDE 40 MG
40 TABLET ORAL ONCE
Qty: 0 | Refills: 0 | Status: COMPLETED | OUTPATIENT
Start: 2018-08-14 | End: 2018-08-14

## 2018-08-14 RX ORDER — FOLIC ACID 0.8 MG
0 TABLET ORAL
Qty: 0 | Refills: 0 | COMMUNITY

## 2018-08-14 RX ORDER — METOPROLOL TARTRATE 50 MG
25 TABLET ORAL DAILY
Qty: 0 | Refills: 0 | Status: DISCONTINUED | OUTPATIENT
Start: 2018-08-14 | End: 2018-08-17

## 2018-08-14 RX ORDER — ATORVASTATIN CALCIUM 80 MG/1
1 TABLET, FILM COATED ORAL
Qty: 0 | Refills: 0 | COMMUNITY

## 2018-08-14 RX ORDER — HEPARIN SODIUM 5000 [USP'U]/ML
5000 INJECTION INTRAVENOUS; SUBCUTANEOUS EVERY 12 HOURS
Qty: 0 | Refills: 0 | Status: DISCONTINUED | OUTPATIENT
Start: 2018-08-14 | End: 2018-08-17

## 2018-08-14 RX ADMIN — HEPARIN SODIUM 5000 UNIT(S): 5000 INJECTION INTRAVENOUS; SUBCUTANEOUS at 17:31

## 2018-08-14 RX ADMIN — Medication 1 MILLIGRAM(S): at 16:30

## 2018-08-14 RX ADMIN — SODIUM CHLORIDE 500 MILLILITER(S): 9 INJECTION INTRAMUSCULAR; INTRAVENOUS; SUBCUTANEOUS at 10:30

## 2018-08-14 RX ADMIN — Medication 40 MILLIGRAM(S): at 16:30

## 2018-08-14 RX ADMIN — FINASTERIDE 5 MILLIGRAM(S): 5 TABLET, FILM COATED ORAL at 16:30

## 2018-08-14 RX ADMIN — PANTOPRAZOLE SODIUM 40 MILLIGRAM(S): 20 TABLET, DELAYED RELEASE ORAL at 17:31

## 2018-08-14 RX ADMIN — Medication 25 MILLIGRAM(S): at 16:30

## 2018-08-14 RX ADMIN — TAMSULOSIN HYDROCHLORIDE 0.4 MILLIGRAM(S): 0.4 CAPSULE ORAL at 21:51

## 2018-08-14 RX ADMIN — ATORVASTATIN CALCIUM 20 MILLIGRAM(S): 80 TABLET, FILM COATED ORAL at 21:51

## 2018-08-14 RX ADMIN — Medication 1000 MILLIGRAM(S): at 16:29

## 2018-08-14 RX ADMIN — Medication 5 MILLIGRAM(S): at 17:31

## 2018-08-14 RX ADMIN — CLOPIDOGREL BISULFATE 75 MILLIGRAM(S): 75 TABLET, FILM COATED ORAL at 16:30

## 2018-08-14 RX ADMIN — SODIUM CHLORIDE 500 MILLILITER(S): 9 INJECTION INTRAMUSCULAR; INTRAVENOUS; SUBCUTANEOUS at 09:35

## 2018-08-14 NOTE — ED ADULT TRIAGE NOTE - CHIEF COMPLAINT QUOTE
pt BIBEMS s/p mechanical fall @ home. pts son reports multiple falls over past few months. denies injury, denies head strike, denies loc. + plavix/ASA. pt w/ hx dementia as per son, pt alert and appropriate at triage answering all questions appropriately. pt w/o complaints.

## 2018-08-14 NOTE — H&P ADULT - HISTORY OF PRESENT ILLNESS
89 yo male with recent diagnosis of pancreatic adenocarcinoma currently on chemo, s/p pancreatic and bile duct stents, CAD s/p stent, s/p TAVR, HTN, HLD, BPH, PAF, chronic anemia presents to ED with complaint of weakness. Pt had last chemo 6 days ago. Typically does have weakness after chemo but it is worse in the past few days. He has not been eating well at home. Has been having hallucination where he says he sees the "heriberto and queen" in his living room. He was found by his neighbor this morning crying and thinking he was in a shoe store. Hallucinations began after chemo. He does complain of chronic SOB since his TAVR. Does have a non productive cough. No fever. Family unable to take care of patient at home, lives by self. Ambulates with walker.

## 2018-08-14 NOTE — ED ADULT NURSE NOTE - NSIMPLEMENTINTERV_GEN_ALL_ED
Implemented All Fall with Harm Risk Interventions:  [place] to call system. Call bell, personal items and telephone within reach. Instruct patient to call for assistance. Room bathroom lighting operational. Non-slip footwear when patient is off stretcher. Physically safe environment: no spills, clutter or unnecessary equipment. Stretcher in lowest position, wheels locked, appropriate side rails in place. Provide visual cue, wrist band, yellow gown, etc. Monitor gait and stability. Monitor for mental status changes and reorient to person, place, and time. Review medications for side effects contributing to fall risk. Reinforce activity limits and safety measures with patient and family. Provide visual clues: red socks.

## 2018-08-14 NOTE — H&P ADULT - NSHPPHYSICALEXAM_GEN_ALL_CORE
Vital Signs Last 24 Hrs  T(C): 36.8 (14 Aug 2018 08:38), Max: 36.8 (14 Aug 2018 08:38)  T(F): 98.2 (14 Aug 2018 08:38), Max: 98.2 (14 Aug 2018 08:38)  HR: 73 (14 Aug 2018 08:38) (73 - 73)  BP: 144/81 (14 Aug 2018 08:38) (144/81 - 144/81)  BP(mean): --  RR: 18 (14 Aug 2018 08:38) (18 - 18)  SpO2: 96% (14 Aug 2018 08:38) (96% - 96%)

## 2018-08-14 NOTE — H&P ADULT - ASSESSMENT
89 yo male with recent diagnosis of pancreatic adenocarcinoma currently on chemo, s/p pancreatic and bile duct stents, CAD s/p stent, s/p TAVR, HTN, HLD, BPH, PAF, chronic anemia admitted with failure to thrive.    #AMS with hallucinations - r/o metabolic encephalopathy vs chemo related  - admit to med-surg  - CT head with no acute patholgy  - CT chest to r/o PNA  - f/u UA  - PT eval  - SW for possible placement    #Failure to thrive  - nutritionist consult    #Pancreatic adenocarcinoma  - outpatient onc follow up    #Chronic anemia  - check iron studies, B12, folate    #HTN/HLD  - continue home meds    #BPH  - continue home meds    #DVT prophylaxis  - heparin sq 89 yo male with recent diagnosis of pancreatic adenocarcinoma currently on chemo, s/p pancreatic and bile duct stents, CAD s/p stent, s/p TAVR, HTN, HLD, BPH, PAF, chronic anemia admitted with failure to thrive.    #AMS with hallucinations - r/o metabolic encephalopathy vs chemo related  - admit to med-surg  - CT head with no acute patholgy  - CT chest to r/o PNA  - f/u UA  - PT eval  - SW for possible placement    #Failure to thrive  - nutritionist consult    #CAD s/p stent, s/p TAVR  - continue plavix, BB, statin    #Pancreatic adenocarcinoma  - outpatient onc follow up    #Chronic anemia  - check iron studies, B12, folate    #HTN/HLD  - continue home meds    #BPH  - continue home meds    #DVT prophylaxis  - heparin sq

## 2018-08-14 NOTE — ED PROVIDER NOTE - PROGRESS NOTE DETAILS
Marty CANAS for ED attending, Doctor Miner: Spoke with pt's son, notes he can no longer care for pt at home. Son is requesting admission for nursing home placement. Will admit. AJM: mild anemia noted. no bleeding. no signs of infection on exam, afebrile, unlikely pna. pending UA. admitted to dr thomson.

## 2018-08-14 NOTE — ED PROVIDER NOTE - MEDICAL DECISION MAKING DETAILS
Pt with delusion and generalized weakness. Son feels unsafe having pt alone at home. Will order CT head, labs, CXR, reassess. Likely admission

## 2018-08-14 NOTE — ED PROVIDER NOTE - OBJECTIVE STATEMENT
89 year old M  with PMHx of pancreatic CA on chemo (last dose a week) ago presents with delusions and generalized weakness. Son notes that typically this happen several days after chemotherapy but the weakness is worse than ususl. Pt was found sitting on his stairs by his neighbors and he thought he was in a shoe store. No hx of dementia. No acute complaints at this time. No f, CP,  SOB, abd pain. No new medications. 89 year old M  with PMHx of pancreatic CA on chemo (last dose a week) ago presents with delusions and generalized weakness. Son notes that typically this happen several days after chemotherapy but the weakness is worse than ususl. Pt was found sitting on his stairs by his neighbors and he thought he was in a shoe store. No hx of dementia. No acute complaints at this time. No fever, CP,  SOB, abd pain. No new medications.

## 2018-08-14 NOTE — ED PROVIDER NOTE - PMH
BPH (benign prostatic hyperplasia)    CAD (coronary artery disease)    High cholesterol    Pancreatic cancer

## 2018-08-15 LAB
ANION GAP SERPL CALC-SCNC: 10 MMOL/L — SIGNIFICANT CHANGE UP (ref 5–17)
BASOPHILS # BLD AUTO: 0.02 K/UL — SIGNIFICANT CHANGE UP (ref 0–0.2)
BASOPHILS NFR BLD AUTO: 0.2 % — SIGNIFICANT CHANGE UP (ref 0–2)
BUN SERPL-MCNC: 13 MG/DL — SIGNIFICANT CHANGE UP (ref 7–23)
CALCIUM SERPL-MCNC: 8.1 MG/DL — LOW (ref 8.5–10.1)
CHLORIDE SERPL-SCNC: 104 MMOL/L — SIGNIFICANT CHANGE UP (ref 96–108)
CHOLEST SERPL-MCNC: 60 MG/DL — SIGNIFICANT CHANGE UP (ref 10–199)
CO2 SERPL-SCNC: 26 MMOL/L — SIGNIFICANT CHANGE UP (ref 22–31)
CREAT SERPL-MCNC: 0.78 MG/DL — SIGNIFICANT CHANGE UP (ref 0.5–1.3)
EOSINOPHIL # BLD AUTO: 0.3 K/UL — SIGNIFICANT CHANGE UP (ref 0–0.5)
EOSINOPHIL NFR BLD AUTO: 3.7 % — SIGNIFICANT CHANGE UP (ref 0–6)
FERRITIN SERPL-MCNC: 429 NG/ML — HIGH (ref 30–400)
FOLATE SERPL-MCNC: >20 NG/ML — SIGNIFICANT CHANGE UP
GLUCOSE SERPL-MCNC: 81 MG/DL — SIGNIFICANT CHANGE UP (ref 70–99)
HCT VFR BLD CALC: 26.4 % — LOW (ref 39–50)
HDLC SERPL-MCNC: 29 MG/DL — LOW
HGB BLD-MCNC: 8.4 G/DL — LOW (ref 13–17)
IMM GRANULOCYTES NFR BLD AUTO: 8.9 % — HIGH (ref 0–1.5)
IRON SATN MFR SERPL: 17 % — SIGNIFICANT CHANGE UP (ref 16–55)
IRON SATN MFR SERPL: 29 UG/DL — LOW (ref 45–165)
LIPID PNL WITH DIRECT LDL SERPL: 18 MG/DL — SIGNIFICANT CHANGE UP
LYMPHOCYTES # BLD AUTO: 1.46 K/UL — SIGNIFICANT CHANGE UP (ref 1–3.3)
LYMPHOCYTES # BLD AUTO: 17.9 % — SIGNIFICANT CHANGE UP (ref 13–44)
MAGNESIUM SERPL-MCNC: 1.8 MG/DL — SIGNIFICANT CHANGE UP (ref 1.6–2.6)
MCHC RBC-ENTMCNC: 31.8 GM/DL — LOW (ref 32–36)
MCHC RBC-ENTMCNC: 34.7 PG — HIGH (ref 27–34)
MCV RBC AUTO: 109.1 FL — HIGH (ref 80–100)
MONOCYTES # BLD AUTO: 0.98 K/UL — HIGH (ref 0–0.9)
MONOCYTES NFR BLD AUTO: 12 % — SIGNIFICANT CHANGE UP (ref 2–14)
NEUTROPHILS # BLD AUTO: 4.67 K/UL — SIGNIFICANT CHANGE UP (ref 1.8–7.4)
NEUTROPHILS NFR BLD AUTO: 57.3 % — SIGNIFICANT CHANGE UP (ref 43–77)
NRBC # BLD: 0 /100 WBCS — SIGNIFICANT CHANGE UP (ref 0–0)
PLATELET # BLD AUTO: 146 K/UL — LOW (ref 150–400)
POTASSIUM SERPL-MCNC: 3.4 MMOL/L — LOW (ref 3.5–5.3)
POTASSIUM SERPL-SCNC: 3.4 MMOL/L — LOW (ref 3.5–5.3)
RBC # BLD: 2.42 M/UL — LOW (ref 4.2–5.8)
RBC # FLD: 20.4 % — HIGH (ref 10.3–14.5)
SODIUM SERPL-SCNC: 140 MMOL/L — SIGNIFICANT CHANGE UP (ref 135–145)
TIBC SERPL-MCNC: 171 UG/DL — LOW (ref 220–430)
TOTAL CHOLESTEROL/HDL RATIO MEASUREMENT: 2.1 RATIO — LOW (ref 3.4–9.6)
TRANSFERRIN SERPL-MCNC: 122 MG/DL — LOW (ref 200–360)
TRIGL SERPL-MCNC: 64 MG/DL — SIGNIFICANT CHANGE UP (ref 10–149)
UIBC SERPL-MCNC: 142 UG/DL — SIGNIFICANT CHANGE UP (ref 110–370)
VIT B12 SERPL-MCNC: 944 PG/ML — SIGNIFICANT CHANGE UP (ref 232–1245)
WBC # BLD: 8.16 K/UL — SIGNIFICANT CHANGE UP (ref 3.8–10.5)
WBC # FLD AUTO: 8.16 K/UL — SIGNIFICANT CHANGE UP (ref 3.8–10.5)

## 2018-08-15 RX ORDER — POTASSIUM CHLORIDE 20 MEQ
40 PACKET (EA) ORAL ONCE
Qty: 0 | Refills: 0 | Status: COMPLETED | OUTPATIENT
Start: 2018-08-15 | End: 2018-08-15

## 2018-08-15 RX ADMIN — HEPARIN SODIUM 5000 UNIT(S): 5000 INJECTION INTRAVENOUS; SUBCUTANEOUS at 17:12

## 2018-08-15 RX ADMIN — Medication 40 MILLIEQUIVALENT(S): at 11:05

## 2018-08-15 RX ADMIN — PANTOPRAZOLE SODIUM 40 MILLIGRAM(S): 20 TABLET, DELAYED RELEASE ORAL at 06:05

## 2018-08-15 RX ADMIN — Medication 25 MILLIGRAM(S): at 06:05

## 2018-08-15 RX ADMIN — ATORVASTATIN CALCIUM 20 MILLIGRAM(S): 80 TABLET, FILM COATED ORAL at 22:23

## 2018-08-15 RX ADMIN — Medication 1 MILLIGRAM(S): at 11:05

## 2018-08-15 RX ADMIN — CLOPIDOGREL BISULFATE 75 MILLIGRAM(S): 75 TABLET, FILM COATED ORAL at 11:05

## 2018-08-15 RX ADMIN — PANTOPRAZOLE SODIUM 40 MILLIGRAM(S): 20 TABLET, DELAYED RELEASE ORAL at 17:12

## 2018-08-15 RX ADMIN — Medication 5 MILLIGRAM(S): at 06:04

## 2018-08-15 RX ADMIN — FINASTERIDE 5 MILLIGRAM(S): 5 TABLET, FILM COATED ORAL at 11:05

## 2018-08-15 RX ADMIN — TAMSULOSIN HYDROCHLORIDE 0.4 MILLIGRAM(S): 0.4 CAPSULE ORAL at 22:23

## 2018-08-15 RX ADMIN — HEPARIN SODIUM 5000 UNIT(S): 5000 INJECTION INTRAVENOUS; SUBCUTANEOUS at 06:05

## 2018-08-15 RX ADMIN — Medication 1000 MILLIGRAM(S): at 11:07

## 2018-08-15 RX ADMIN — Medication 5 MILLIGRAM(S): at 17:12

## 2018-08-15 NOTE — PHYSICAL THERAPY INITIAL EVALUATION ADULT - GENERAL OBSERVATIONS, REHAB EVAL
resting in bed with blankets pulled up to chin,asleep but easily arousable,he is reading short stories by Hi Diana,he is alert & Ox3,gruf when answering questions and seems irritable when exam questions posed

## 2018-08-15 NOTE — CHART NOTE - NSCHARTNOTEFT_GEN_A_CORE
Upon Nutritional Assessment by the Registered Dietitian your patient was determined to meet criteria / has evidence of the following diagnosis/diagnoses:          [ ]  Mild Protein Calorie Malnutrition        [ ]  Moderate Protein Calorie Malnutrition        [x] Severe Protein Calorie Malnutrition        [ ] Unspecified Protein Calorie Malnutrition        [ ] Underweight / BMI <19        [ ] Morbid Obesity / BMI > 40      Findings:  Upon visit, pt appears overwt with mild temporal/clavicle muscle wasting on NFPE noted.  Pt A&O x 4 per RN. Pt diet recall reveals pt meeting <50% of estimated nutr needs chronically.  EMR shows wt loss of 11% (22#) in past 4 months; clinically significant. trace Lt/Rt ankle edema.  Based on above, pt meets criteria for severe malnutrition in chronic illness.    Findings as based on:  •  Comprehensive nutrition assessment and consultation  •  Calorie counts (nutrient intake analysis)  •  Food acceptance and intake status from observations by staff  •  Follow up  •  Patient education  •  Intervention secondary to interdisciplinary rounds  •   concerns      Treatment:    The following diet has been recommended:  1) add ensure enlive TID   2) change diet Rx to regular   3) check phos levels; monitor lytes/mins; replete/correct prn   4) encourage increased PO intake   5) daily wt checks    PROVIDER Section:     By signing this assessment you are acknowledging and agree with the diagnosis/diagnoses assigned by the Registered Dietitian    Comments:

## 2018-08-15 NOTE — DIETITIAN INITIAL EVALUATION ADULT. - OTHER INFO
received consult for assessment. 89 yo male with recent diagnosis of pancreatic adenocarcinoma currently on chemo, s/p pancreatic and bile duct stents, CAD s/p stent, s/p TAVR, HTN, HLD, BPH, PAF, chronic anemia presents to ED with complaint of weakness.  last chemo was 6 days ago with hallucinations and poor PO intake.  AMS with hallucinations 2/2 chemo vs metabolic encephalopathy.  Upon visit, pt appears overwt with mild temporal/clavicle muscle wasting on NFPE noted.  Pt A&O x 4 per RN. Pt diet recall reveals pt meeting <50% of estimated nutr needs chronically.  EMR shows wt loss of 11% (22#) in past 4 months; clinically significant. trace Lt/Rt ankle edema.  Based on above, pt meets criteria for severe malnutrition in chronic illness.  labs noted; hypokalemia and borderline low magnesium.  RECOMMENDATIONS: 1) add ensure enlive TID 2) change diet Rx to regular 3) check phos levels; monitor lytes/mins; replete/correct prn 4) encourage increased PO intake 5) daily wt checks

## 2018-08-15 NOTE — CONSULT NOTE ADULT - ASSESSMENT
89y old Male with hx of recent diagnosis of pancreatic adenocarcinoma (4/18) currently on chemo ([facility] in [place]), s/p pancreatic and bile duct stents, CAD s/p stent, s/p TAVR, HTN, HLD, BPH, PAF, chronic anemia admitted 8/14 for weakness, poor po intake and hallucinations possibly from chemo. Pt had last chemo 6 days ago. Typically does have weakness after chemo but it is worse in the past few days. He was found by his neighbor this morning crying and thinking he was in a shoe store. He does complain of chronic SOB since his TAVR. Family unable to take care of patient at home, lives by self. Found here to have negative CTH for acute pathology, CTC showing mild-mod bl pleural effusions R>L and mild PVCs. Palliative Care consulted to assist with establish GOC.     1) AMS/Hallucinations  - seem to be associated with chemo, and have resolved since being here  - workup thus far negative for any other pathology that could cause this  - CTH negative for acute issues, showing only chronic ischemic changes with some volume loss  - c/w monitoring  - fall precautions (pt noted he has fallen a few times in past few months)    2) Dyspnea  - denies during conversation but notes this on exertion  - hx of CAD and TAVR  - CTC showing effusions and PVCs  - ?lasix  - c/w supplemental O2  - can consider low dose roxanol 2.5 mg q4-6h prn air hunger if above insufficient    3) Pancreatic Ca  - recently diagnosed, pt follows at [facility] for this  - he notes having 2 more rounds of chemo left and desire to continue with this  - seems (according to SW conversations with HCP1 Cassia) children may disagree with this  - recommend primary team reaches out to [facility] oncologist to see if more chemo is being offered given side effects have led to hospitalization    4) Debility  - PPS 30-40%  - nutrition/dietary notes appreciated, severe protein calorie malnutrition noted  - muscle/fat loss, 11% weight loss in past 4 months, albumin 2.5  - PT consult says completed, awaiting impression  - as per primary team, pt not open to idea of placement/kim    5) Prognosis   - likely overall poor  - in setting of advanced age, pancreatic cancer with treatment leading to side effects, waning functional status also as complication of treatment, severe protein calorie malnutrition, and dyspnea, pt would fit criteria for hospice if in alignment with wishes     6) GOC/Advanced Directives  - pt has capacity, AMS has resolved  - HCP on chart naming children: 1) Daughter Cassia and 2) son Farhan [phone]  - full code, discussed and pt maintains this desire  - GOC meeting tentatively set for Saturday at 11am. Let SW know though that if she reaches out to son and his sister can be present in-person earlier with him present via phone (since he has to work) then we can reschedule for earlier time. Pt open to home palliative care plan, so far not on board (as per team ) with placement although son feels like he cannot return home alone. Hopefully we will be able to discuss this further at a meeting. However, pt has capacity and even if family does not agree we cannot force pt to comply.     Thank you for including us in Mr. Campo's care. Will continue to follow with you.    Francoise Mayo MD  Palliative Care Attending 89y old Male with hx of recent diagnosis of pancreatic adenocarcinoma (4/18) currently on chemo ([facility] in [place]), s/p pancreatic and bile duct stents, CAD s/p stent, s/p TAVR, HTN, HLD, BPH, PAF, chronic anemia admitted 8/14 for weakness, poor po intake and hallucinations possibly from chemo. Pt had last chemo 6 days ago. Typically does have weakness after chemo but it is worse in the past few days. He was found by his neighbor this morning crying and thinking he was in a shoe store. He does complain of chronic SOB since his TAVR. Family unable to take care of patient at home, lives by self. Found here to have negative CTH for acute pathology, CTC showing mild-mod bl pleural effusions R>L and mild PVCs. Palliative Care consulted to assist with establish GOC.     1) AMS/Hallucinations  - seem to be associated with chemo, and have resolved since being here  - workup thus far negative for any other pathology that could cause this  - CTH negative for acute issues, showing only chronic ischemic changes with some volume loss  - c/w monitoring  - fall precautions (pt noted he has fallen a few times in past few months)    2) Dyspnea  - denies during conversation but notes this on exertion  - hx of CAD and TAVR  - CTC showing effusions and PVCs  - ?lasix  - c/w supplemental O2  - can consider low dose roxanol 2.5 mg q4-6h prn air hunger if above insufficient    3) Pancreatic Ca  - recently diagnosed, pt follows at [facility] for this  - he notes having 2 more rounds of chemo left and desire to continue with this  - seems (according to SW conversations with HCP1 Cassia) children may disagree with this  - recommend primary team reaches out to [facility] oncologist to see if more chemo is being offered given side effects have led to hospitalization    4) Debility  - PPS 30-40%  - nutrition/dietary notes appreciated, severe protein calorie malnutrition noted  - muscle/fat loss, 11% weight loss in past 4 months, albumin 2.5  - PT consult says completed, awaiting impression  - as per primary team, pt not open to idea of placement/kim    5) Prognosis   - likely overall poor  - in setting of advanced age, pancreatic cancer with treatment leading to side effects, waning functional status also as complication of treatment, severe protein calorie malnutrition, and dyspnea, pt would fit criteria for hospice if in alignment with wishes     6) GOC/Advanced Directives  - pt has capacity, AMS has resolved  - HCP on chart naming children: 1) Daughter Cassia and 2) son Farhan [phone]  - full code, discussed and pt maintains this desire  - GOC meeting tentatively set for Saturday at 11am. Let SW know though that if she reaches out to son and his sister can be present in-person earlier with him present via phone (since he has to work) then we can reschedule for earlier time. Pt open to home palliative care plan, so far not on board (as per team ) with placement although son feels like he cannot return home alone. Hopefully we will be able to discuss this further at a meeting. However, pt has capacity and even if family does not agree we cannot force pt to comply. *Spent 30 minutes discussing GOC with patient including Advance care planning, explanation and discussion of advance directives. See GOC note for further details.    Thank you for including us in Mr. Campo's care. Will continue to follow with you.    Francoise Mayo MD  Palliative Care Attending

## 2018-08-15 NOTE — GOALS OF CARE CONVERSATION - PERSONAL ADVANCE DIRECTIVE - CONVERSATION DETAILS
Met with Mr. Campo and initiated conversation about his medical issues and GOC which will hopefully be followed up with meeting including his family. Pt was well aware of his cancer diagnosis, able to say when and how it was diagnosed and give full account of treatment plan and side effects since then. He notes being diagnosed back in April and having a big family meeting about this with his 5 children (son Farhan lives in [place], 2 daughters including Cassia live in [place], other daughter and other son in [place]). They had to decide between surgery and chemo, with clinicians telling him that he was too old and with too great a risk of dying to pursue surgery. He and his family discussed options and chose to pursue chemo. He says he has been on it since then and that it has been difficult. While he was told that it could come with all sorts of "terrible symptoms" he denied having most of these (n/v/d/etc), plagued instead by hallucinations and severe fatigue after sessions.     He explained in great detail his hallucinations and how he understood that they were not real. He was not frightened by them, finding them interesting. Sometimes he says he sees and speaks to his wife, but knows quickly that this is also not real, but at times emotionally satisfying (he was very briefly tearful when sharing this). His children are aware of them as well, as they check in on him often. His son Farhan lives the closest and sees him every Sunday, thus, more aware of when he has an episode then his other children. Other than his son, he feels like he has enough support at home via an aid that comes 3x/week for few hours to clean and help him with household chores. She also drives him to his chemo appointments sometimes, this is supplemented by his [place] when needed. He says he walks pretty well, though his dyspnea has increased, he only uses walker on occasion. Spent some time having to talk him into considering that he may need O2 at home as a result. Also, when asked, he confessed to falling sometimes, which makes him increasingly aware of the finishing of floors, fearful that if too slick he may need to take more care to prevent falling.     Pt is not afraid of his cancer diagnosis. Given his experience with chemo, inquired if he wanted to continue with this. He noted he is a practical man, saying he only has 2 more sessions left and it seems worth it to him to finish this. He says he did not pay attention to whether this was said to be curative or palliative, saying that nobody truly knows the answer to this anyway. He denies having any concerns. He hopes to finish his chemo and still be able to do what he enjoys which is reading, writing, and taking in every experience of his life as he does not like to waste anything, especially time. He has meticulously planned out his affairs for his children when he dies, down to how to get the money he leaves behind and how many death certificates will be needed to prevent any issues with this. He confirms who his proxies are and that his daughter Walt also serves as his Power of .      Despite all of this, including his understanding of the impermanence of life, and openness to medical perspectives as they pertain to likelihoods in pt's like him with pancreatic cancer; he still would like to be full code. He feels like choosing against this is akin to "throwing in the towel." We discussed this further, namely clinicians concerns that these interventions would likely prevent him from having what he previously described as quality of life. Thus, we talked about our role in his care and the usefulness of perhaps having another family meeting like he had in the past to review best plan moving forward. He was open to this, though feeling sure that his plan will be to continue with chemo, and return home (open to home palliative given plan to c/w treatment).     Called maya Real after meeting and he works daily thus is unable to come in until Saturday at 11am. Shared some of pt's abovementioned sentiments in case pt is dc'd before we get the chance to meet. He was concerned about plan to go home as he does not feel pt can managed at home, and he wanted  to call him saying that pt needed to go to rehab. Let him know that the medical team has the same concerns, but if pt is capacitated we cannot force him to comply. We agreed that discussing this further would be ideal and hoped to have the chance to do so. Spoke with  after, since he asked to speak with her, and encouraged her to let him know the same; as well as invitation to other children to be at meeting given they voiced questions to her as well.

## 2018-08-15 NOTE — PHYSICAL THERAPY INITIAL EVALUATION ADULT - PERTINENT HX OF CURRENT PROBLEM, REHAB EVAL
pancreatic CA diagnosed 6 months ago,receiving chemoRx [id number] x past 5 months now with visual hallucinations and AMS

## 2018-08-15 NOTE — PHYSICAL THERAPY INITIAL EVALUATION ADULT - CRITERIA FOR SKILLED THERAPEUTIC INTERVENTIONS
rehab potential/therapy frequency/predicted duration of therapy intervention/anticipated discharge recommendation/impairments found/anticipated equipment needs at discharge/functional limitations in following categories

## 2018-08-15 NOTE — PHYSICAL THERAPY INITIAL EVALUATION ADULT - GROSSLY INTACT, SENSORY
Grossly Intact/pt reports + parasthesias in finger tips and toes since beginning chemo that interfere with fine motor tasks including buttons on shirt,manipulating implements

## 2018-08-15 NOTE — PHYSICAL THERAPY INITIAL EVALUATION ADULT - AMBULATION SKILLS, REHAB EVAL
independent/pt states he owns a RW but only uses it when he feels he needs it ,not on a regular or set basis

## 2018-08-15 NOTE — PHYSICAL THERAPY INITIAL EVALUATION ADULT - LEVEL OF INDEPENDENCE: SCOOT/BRIDGE, REHAB EVAL
supervision/stand-by assist/pt refused bridging in bed claiming there was nothing to push against depite multiple attempts to have him push into mattress with heels argues his legs will slide out ,even with Mark Treads (pt gets seemingly worked up,aggravated) so aborted attempts

## 2018-08-15 NOTE — CONSULT NOTE ADULT - SUBJECTIVE AND OBJECTIVE BOX
HPI: Mr. Campo is a 89y old Male with hx of recent diagnosis of pancreatic adenocarcinoma () currently on chemo ([facility] in [place]), s/p pancreatic and bile duct stents, CAD s/p stent, s/p TAVR, HTN, HLD, BPH, PAF, chronic anemia admitted  for weakness, poor po intake and hallucinations possibly from chemo. Pt had last chemo 6 days ago. Typically does have weakness after chemo but it is worse in the past few days. He was found by his neighbor this morning crying and thinking he was in a shoe store. He does complain of chronic SOB since his TAVR. Family unable to take care of patient at home, lives by self. Found here to have negative CTH for acute pathology, CTC showing mild-mod bl pleural effusions R>L and mild PVCs. Palliative Care consulted to assist with establish GOC.     Met Mr. Campo this afternoon, no family at bedside. Pt initially asleep, but easily awakened to voice. Pt denied any pain. He did not dyspnea at times with exertion, recalling walking in [place] earlier with RN checking sats (which he said went to 80s but then returned to 90s). He says this has been the case since chemo started. He also notes profound fatigue with each session, saying that he does not have the energy to do anything beside lay down and sleep when he returns home. He also endorses waning appetite and some weight loss. He notes having some help at home with a woman who comes 3x/week to help with household chores and drives him to and fro if needed, otherwise his Spiritism arranges trips to chemo sessions for him. He denied any other complaints, listing the usual questions in ROS that he has obviously been asked often.     Pt had comprehensive understanding of his cancer diagnosis, offering it as his major issue nearly immediately during encounter. He was open to initiation of his GOC discussion during encounter and us reaching out to son (HCP2 Farhan [phone]) for meeting later on with family involved, tentatively scheduled meeting with son for Saturday at 11am as he works. See GOC note for more details.       PAIN: ( )Yes   ( x)No  DYSPNEA: (x) Yes  ( ) No  Level: mild with exertion    PAST MEDICAL & SURGICAL HISTORY:  Paroxysmal A-fib  Pancreatic cancer  BPH (benign prostatic hyperplasia)  High cholesterol  CAD (coronary artery disease)  S/P TAVR (transcatheter aortic valve replacement)      SOCIAL HX: Lives at home alone,  7y ago, 5 children, retired     Hx opiate tolerance ( )YES  ( x)NO    Baseline ADLs  (Prior to Admission)  (x ) Independent   ( )Dependent    FAMILY HISTORY:  No pertinent family history in first degree relatives      Review of Systems:    Anxiety- denies  Depression- denies  Constipation- denies  Diarrhea- denies  Nausea- denies  Vomiting- denies  Anorexia- yes  Weight Loss- yes  Cough- yes  Secretions- at times productive  Fatigue- yes especially after chemo  Weakness- yes, mild  Delirium- hallucinations at times, none since prior to admission    All other systems reviewed and negative      PHYSICAL EXAM:    Vital Signs Last 24 Hrs  T(C): 36.6 (15 Aug 2018 04:59), Max: 36.6 (14 Aug 2018 17:09)  T(F): 97.9 (15 Aug 2018 04:59), Max: 97.9 (14 Aug 2018 17:09)  HR: 66 (15 Aug 2018 04:59) (57 - 66)  BP: 117/51 (15 Aug 2018 04:59) (117/51 - 127/56)  BP(mean): --  RR: 18 (15 Aug 2018 04:59) (18 - 19)  SpO2: 93% (15 Aug 2018 04:59) (93% - 97%)  Daily     Daily Weight in k (15 Aug 2018 11:14)    PPSV2: 30-40  %    General: Elderly male sitting up in bed, pleasant, friendly, NAD  Mental Status: AOx4  HEENT: mmm, perrl, nc in place, some temporal and clavicular muscle wasting  Lungs: dec at bases bl  Cardiac: +s1 s2 rrr  GI: soft nt nd +bs  : incontinent intermittently  Ext: no edema, moves all extremities  Neuro: no focal deficits      LABS:                        8.4    8.16  )-----------( 146      ( 15 Aug 2018 06:34 )             26.4     08-15    140  |  104  |  13  ----------------------------<  81  3.4<L>   |  26  |  0.78    Ca    8.1<L>      15 Aug 2018 06:34  Mg     1.8     08-15    TPro  6.1  /  Alb  2.5<L>  /  TBili  1.2  /  DBili  x   /  AST  24  /  ALT  28  /  AlkPhos  86        Albumin: Albumin, Serum: 2.5 g/dL ( @ 09:14)      Allergies    No Known Allergies    Intolerances      MEDICATIONS  (STANDING):  ascorbic acid 1000 milliGRAM(s) Oral daily  atorvastatin 20 milliGRAM(s) Oral at bedtime  clopidogrel Tablet 75 milliGRAM(s) Oral daily  enalapril 5 milliGRAM(s) Oral two times a day  finasteride 5 milliGRAM(s) Oral daily  folic acid 1 milliGRAM(s) Oral daily  heparin  Injectable 5000 Unit(s) SubCutaneous every 12 hours  metoprolol succinate ER 25 milliGRAM(s) Oral daily  pantoprazole    Tablet 40 milliGRAM(s) Oral two times a day  tamsulosin 0.4 milliGRAM(s) Oral at bedtime    MEDICATIONS  (PRN):      RADIOLOGY/ADDITIONAL STUDIES:    EXAM:  CT CHEST                        PROCEDURE DATE:  2018      IMPRESSION:         Mild to moderate bilateral pleural effusions right greater than left with   bibasilar atelectasis right greater than left. Additional interlobular   septal thickening with patchy groundglass opacities suggesting mild   pulmonary edema.  Medically correlate for superimposed pneumonia.      VALERIA CAMPOS M.D., ATTENDING RADIOLOGIST  This document has been electronically signed. Aug 14 2018  2:37PM      EXAM:  CT BRAIN                        PROCEDURE DATE:  2018      IMPRESSION:    1)  chronic ischemic changes with moderate volume loss. No acute   abnormality suggested..  2)  no intracerebral hemorrhage or contusion is identified.      KENYATTA LOPEZ   This document has been electronically signed. Aug 14 2018 10:53AM

## 2018-08-15 NOTE — PROGRESS NOTE ADULT - ASSESSMENT
89 yo male with recent diagnosis of pancreatic adenocarcinoma currently on chemo, s/p pancreatic and bile duct stents, CAD s/p stent, s/p TAVR, HTN, HLD, BPH, PAF, chronic anemia admitted with failure to thrive.    #AMS with hallucinations - r/o metabolic encephalopathy vs chemo related  - admit to med-surg  - CT head with no acute patholgy  - CT chest to r/o PNA  - f/u [facility]  - PT eval  - SW for possible placement    #Failure to thrive  - nutritionist consult    #CAD s/p stent, s/p TAVR  - continue plavix, BB, statin    #Pancreatic adenocarcinoma  - outpatient onc follow up    #Chronic anemia  - check iron studies, B12, folate    #HTN/HLD  - continue home meds    #BPH  - continue home meds    #DVT prophylaxis  - heparin sq 89 yo male with recent diagnosis of pancreatic adenocarcinoma currently on chemo, s/p pancreatic and bile duct stents, CAD s/p stent, s/p TAVR, HTN, HLD, BPH, PAF, chronic anemia admitted with failure to thrive.    #AMS with hallucinations -  r/o metabolic encephalopathy vs chemo related  - patient w/o signs of systemic infection, likely moreso dehydration and chemotherapy induced.   - given advanced age and overall poor functional status after each chemotherapy session would advise conservative measures with home hospice.   - consulted Palliative --> appreciate eval and recs for family meeting with son on Saturday, delayed due to him working.   - discussed extensively on the phone with Cassia (daughter) who wants second eval with Oncologist here and would wants palliative care.   - CT head with no acute patholgy  - CT chest to r/o PNA - doubtful, no symptoms.   - PT eval  - SW for possible placement    #Failure to thrive  - nutritionist consult    #CAD s/p stent, s/p TAVR  - continue plavix, BB, statin    #Pancreatic adenocarcinoma  - outpatient onc follow up    #Chronic anemia  - check iron studies, B12, folate    #HTN/HLD  - continue home meds    #BPH- continue home meds    #DVT prophylaxis- heparin sq    Dispo: remain inpatient, discussed on IDR. Pending Onc input.   Total time > 40 mins.

## 2018-08-16 ENCOUNTER — TRANSCRIPTION ENCOUNTER (OUTPATIENT)
Age: 83
End: 2018-08-16

## 2018-08-16 LAB
ANION GAP SERPL CALC-SCNC: 8 MMOL/L — SIGNIFICANT CHANGE UP (ref 5–17)
BUN SERPL-MCNC: 16 MG/DL — SIGNIFICANT CHANGE UP (ref 7–23)
CALCIUM SERPL-MCNC: 8.1 MG/DL — LOW (ref 8.5–10.1)
CHLORIDE SERPL-SCNC: 103 MMOL/L — SIGNIFICANT CHANGE UP (ref 96–108)
CO2 SERPL-SCNC: 25 MMOL/L — SIGNIFICANT CHANGE UP (ref 22–31)
CREAT SERPL-MCNC: 0.84 MG/DL — SIGNIFICANT CHANGE UP (ref 0.5–1.3)
GLUCOSE SERPL-MCNC: 125 MG/DL — HIGH (ref 70–99)
HCT VFR BLD CALC: 28 % — LOW (ref 39–50)
HGB BLD-MCNC: 9 G/DL — LOW (ref 13–17)
MCHC RBC-ENTMCNC: 32.1 GM/DL — SIGNIFICANT CHANGE UP (ref 32–36)
MCHC RBC-ENTMCNC: 34.9 PG — HIGH (ref 27–34)
MCV RBC AUTO: 108.5 FL — HIGH (ref 80–100)
NRBC # BLD: 0 /100 WBCS — SIGNIFICANT CHANGE UP (ref 0–0)
PLATELET # BLD AUTO: 197 K/UL — SIGNIFICANT CHANGE UP (ref 150–400)
POTASSIUM SERPL-MCNC: 3.9 MMOL/L — SIGNIFICANT CHANGE UP (ref 3.5–5.3)
POTASSIUM SERPL-SCNC: 3.9 MMOL/L — SIGNIFICANT CHANGE UP (ref 3.5–5.3)
RBC # BLD: 2.58 M/UL — LOW (ref 4.2–5.8)
RBC # FLD: 19.9 % — HIGH (ref 10.3–14.5)
SODIUM SERPL-SCNC: 136 MMOL/L — SIGNIFICANT CHANGE UP (ref 135–145)
WBC # BLD: 9.15 K/UL — SIGNIFICANT CHANGE UP (ref 3.8–10.5)
WBC # FLD AUTO: 9.15 K/UL — SIGNIFICANT CHANGE UP (ref 3.8–10.5)

## 2018-08-16 RX ORDER — FUROSEMIDE 40 MG
20 TABLET ORAL ONCE
Qty: 0 | Refills: 0 | Status: COMPLETED | OUTPATIENT
Start: 2018-08-16 | End: 2018-08-16

## 2018-08-16 RX ORDER — METOPROLOL TARTRATE 50 MG
1 TABLET ORAL
Qty: 0 | Refills: 0 | COMMUNITY
Start: 2018-08-16

## 2018-08-16 RX ADMIN — HEPARIN SODIUM 5000 UNIT(S): 5000 INJECTION INTRAVENOUS; SUBCUTANEOUS at 18:58

## 2018-08-16 RX ADMIN — Medication 25 MILLIGRAM(S): at 05:56

## 2018-08-16 RX ADMIN — Medication 5 MILLIGRAM(S): at 05:56

## 2018-08-16 RX ADMIN — Medication 1 MILLIGRAM(S): at 12:53

## 2018-08-16 RX ADMIN — HEPARIN SODIUM 5000 UNIT(S): 5000 INJECTION INTRAVENOUS; SUBCUTANEOUS at 05:56

## 2018-08-16 RX ADMIN — ATORVASTATIN CALCIUM 20 MILLIGRAM(S): 80 TABLET, FILM COATED ORAL at 22:09

## 2018-08-16 RX ADMIN — CLOPIDOGREL BISULFATE 75 MILLIGRAM(S): 75 TABLET, FILM COATED ORAL at 12:53

## 2018-08-16 RX ADMIN — PANTOPRAZOLE SODIUM 40 MILLIGRAM(S): 20 TABLET, DELAYED RELEASE ORAL at 05:56

## 2018-08-16 RX ADMIN — PANTOPRAZOLE SODIUM 40 MILLIGRAM(S): 20 TABLET, DELAYED RELEASE ORAL at 18:58

## 2018-08-16 RX ADMIN — Medication 20 MILLIGRAM(S): at 16:28

## 2018-08-16 RX ADMIN — FINASTERIDE 5 MILLIGRAM(S): 5 TABLET, FILM COATED ORAL at 12:52

## 2018-08-16 RX ADMIN — TAMSULOSIN HYDROCHLORIDE 0.4 MILLIGRAM(S): 0.4 CAPSULE ORAL at 22:09

## 2018-08-16 RX ADMIN — Medication 1000 MILLIGRAM(S): at 12:52

## 2018-08-16 NOTE — DISCHARGE NOTE ADULT - OTHER SIGNIFICANT FINDINGS
CT Chest No Cont (08.14.18 @ 13:54) >  Mild to moderate bilateral pleural effusions right greater than left with   bibasilar atelectasis right greater than left. Additional interlobular   septal thickening with patchy groundglass opacities suggesting mild   pulmonary edema.  Medically correlate for superimposed pneumonia.

## 2018-08-16 NOTE — CONSULT NOTE ADULT - ASSESSMENT
This is a 89-year-old gentleman with a history of locally advanced pancreatic adenocarcinoma presently on active treatment with gemcitabine and Abraxane admitted for weakness. This is a 89-year-old gentleman with a history of locally advanced pancreatic adenocarcinoma presently on active treatment with gemcitabine and Abraxane admitted for weakness.    Thank you for allowing me to participate in Mr. Campo's care.  He is a very pleasant 89-year-old gentleman with locally advanced pancreatic adenocarcinoma who has been up-to-date tolerating gemcitabine and Abraxane.  The patient during this encounter noted that after treatment he feels very lethargic and weak.  His primary concern regarding additional therapy is whether or not he has had any response to be 4+ months of previous chemotherapy.  The patient notes that he will discuss this with his son over the last few days and both the patient as well as the patient's son are somewhat apprehensive to additional systemic chemotherapy.  After discussing the case with the patient's primary oncologist last night from radiographic findings it was suggested that the patient had locally advanced disease.  Now that he is completed about 4 months of treatment it may be reasonable to forego additional systemic chemotherapy and possibly add radiation therapy for consolidative treatment. This treatment approach would be my preference.     The patient made it very clear that if he was indeed offered additional chemotherapy that he would take it with reservations as he very concerned regarding the decline in his quality of life.     Also during this encounter discussed with the patient that it is conceivable that the patient no longer pursue systemic chemotherapy and opt for a symptom  directed therapy/ palliative approach. He was not interested in this approach at this time.     At this time the patient does not wish to transition his care and Dr. Boykin will resume systemic treatment options as well as further management.    I have made his primary oncologist aware of this hospitalization.     I will contact the patient's son later today to discuss my findings with the patient.    Suggestions:   1. Discussed the following treatment options      a. Discontinuation of Chemotherapy --> RE-imaging --> Radiation evaluation for consolidative RT       b. Continuation of [place]+ Abraxane as planned with Dr. Boykin       c.. Symptom directed treatement with no RT   2. Will discuss with patients Son later today.       If you have any questions contact me at [phone]

## 2018-08-16 NOTE — DISCHARGE NOTE ADULT - PLAN OF CARE
improvement of weakness You were admitted for weakness and confusion related to chemotherapy.   - plan is to STOP chemo and start rehab. Follow up outpatient with your oncologist regarding further potential treatment if desired.

## 2018-08-16 NOTE — DISCHARGE NOTE ADULT - HOSPITAL COURSE
CC: confusion    HPI: This is a 89 yo male with recent diagnosis of pancreatic adenocarcinoma currently on chemo, s/p pancreatic and bile duct stents, CAD s/p stent, s/p TAVR, HTN, HLD, BPH, PAF, chronic anemia presents to ED with complaint of weakness. Pt had last chemo 6 days ago. Typically does have weakness after chemo but it is worse in the past few days. He has not been eating well at home. Has been having hallucination where he says he sees the "heriberto and queen" in his living room. He was found by his neighbor this morning crying and thinking he was in a shoe store. Hallucinations began after chemo. He does complain of chronic SOB since his TAVR. Does have a non productive cough. No fever. Family unable to take care of patient at home, lives by self. Ambulates with walker.     8/15 Subj: Seen earlier this AM, denies HA / fevers/ chills. States "hallucinations" are common side effect he experiences after chemo and immediately after each session in the past he goes home "wiped out, no energy".  8/16: Still a bit weak, no pain, no CP, endorses chronic dyspnea worsened with exertion.     8/17:    ROS: + generalized weakness, all other ROS is neg unless stated above.     Vital Signs Last 24 Hrs  T(C): 36.9 (16 Aug 2018 10:53), Max: 36.9 (15 Aug 2018 22:29)  T(F): 98.4 (16 Aug 2018 10:53), Max: 98.4 (15 Aug 2018 22:29)  HR: 73 (16 Aug 2018 10:53) (73 - 83)  BP: 105/48 (16 Aug 2018 10:53) (104/56 - 125/60)  BP(mean): --  RR: 18 (16 Aug 2018 10:53) (18 - 18)  SpO2: 93% (16 Aug 2018 10:53) (92% - 94%)    PHYSICAL EXAM:  Constitutional: NAD, awake and alert, well-developed elderly male appears comfortable.   HEENT: PERR, EOMI, Normal Hearing, MMM  Neck: Soft and supple, No LAD, No JVD  Respiratory: Breath sounds are audible bilaterally, mildly decreased on bases, no wheezing.   Cardiovascular: S1 and S2, regular rate and rhythm, no Murmurs, gallops or rubs  Gastrointestinal: Bowel Sounds present, soft, nontender, nondistended, no guarding, no rebound  Extremities: No peripheral edema  Vascular: 2+ peripheral pulses  Neurological: A/O x 3, no focal deficits  Musculoskeletal: 5/5 strength b/l upper and lower extremities  Skin: No rashes    All meds/ images reviewed.     89 yo male with recent diagnosis of pancreatic adenocarcinoma currently on chemo, s/p pancreatic and bile duct stents, CAD s/p stent, s/p TAVR, HTN, HLD, BPH, PAF, chronic anemia admitted with failure to thrive.      # Toxic encephalopathy secondary to an adverse reaction from chemo  - patient w/o signs of systemic infection, likely moreso dehydration and chemotherapy induced.   - given advanced age and overall poor functional status after each chemotherapy session would advise conservative measures  - discussed w/ Onc who discussed w/ patient's oncologist Dr. Trevino in [facility] --> wish to STOP chemo, transition to subacute Rehab for strengthening and will re-evaluate upon dc from rehab whether to pursue radiation tx outpatient.   - CT head with no acute patholgy  - CT chest to r/o PNA - doubtful, no symptoms.   - PT eval  - SW for possible placement --> accepted to [facility].     # Mild Hypoxic Respiratory Distress - 2/2 pleural effusions, will give IV lasix 20mg X1 today.   - repeat AM BMP    # HTN - patient w/ advanced age and low normal BP, would liberalize BP. Possibly contributing to weakness.  - lower Enalapril from 5mg to 2.5mg daily. If still low will DC altogether.     #Failure to thrive  - nutritionist consult --> severe protein calorie malnutrition noted, encourage supplemental shakes.     #CAD s/p stent, s/p TAVR  - continue plavix, BB, statin    #Pancreatic adenocarcinoma  - outpatient onc follow up    #Chronic anemia - likely chemo induced with mild iron deficiency anemia.   - normal B12, folate    #HTN/HLD  - continue home meds    #BPH- continue home meds    #DVT prophylaxis- heparin sq    Dispo: dc to [facility]. Discussed w/ Dr. Tomas.  Total time > 40 mins.

## 2018-08-16 NOTE — DISCHARGE NOTE ADULT - CARE PLAN
Principal Discharge DX:	Pancreatic cancer  Goal:	improvement of weakness  Assessment and plan of treatment:	You were admitted for weakness and confusion related to chemotherapy.   - plan is to STOP chemo and start rehab. Follow up outpatient with your oncologist regarding further potential treatment if desired.

## 2018-08-16 NOTE — CONSULT NOTE ADULT - SUBJECTIVE AND OBJECTIVE BOX
HEMATOLOGY ONCOLOGY CONSULT     Patient is a 89y old  Male who presents with a chief complaint of failure to thrive, AMS (14 Aug 2018 12:01)      HPI:  This is a 89-year-old gentleman with recently diagnosed locally advanced pancreatic adenocarcinoma diagnosed in May 2018 deemed not to be a surgical candidate after presenting with obstructive jaundice and had both biliary and pancreatic ductal stents placed, subsequently treated with gemcitabine and Abraxane for which he completed 4 months of therapy thus far who presents for generalized weakness.  The patient's last treatment was on August 10.  He notes that after his treatments he feels very lethargic and weak.  And this is not unusual after being treated.  The patient's primary oncologist is Dr. Trevino.  Of note the patient, prior to admission was found by his neighbor with hallucinations.    Treatment: Gemcitabine plus Abraxane  Current disease status: Stable    ROS:  Negative except for:    PAST MEDICAL & SURGICAL HISTORY:  Paroxysmal A-fib  Pancreatic cancer  BPH (benign prostatic hyperplasia)  High cholesterol  CAD (coronary artery disease)  S/P TAVR (transcatheter aortic valve replacement)      SOCIAL HISTORY:    FAMILY HISTORY:  No pertinent family history in first degree relatives      MEDICATIONS  (STANDING):  ascorbic acid 1000 milliGRAM(s) Oral daily  atorvastatin 20 milliGRAM(s) Oral at bedtime  clopidogrel Tablet 75 milliGRAM(s) Oral daily  enalapril 5 milliGRAM(s) Oral two times a day  finasteride 5 milliGRAM(s) Oral daily  folic acid 1 milliGRAM(s) Oral daily  heparin  Injectable 5000 Unit(s) SubCutaneous every 12 hours  metoprolol succinate ER 25 milliGRAM(s) Oral daily  pantoprazole    Tablet 40 milliGRAM(s) Oral two times a day  tamsulosin 0.4 milliGRAM(s) Oral at bedtime        Allergies    No Known Allergies    Intolerances        Vital Signs Last 24 Hrs  T(C): 36.8 (16 Aug 2018 05:06), Max: 36.9 (15 Aug 2018 22:29)  T(F): 98.2 (16 Aug 2018 05:06), Max: 98.4 (15 Aug 2018 22:29)  HR: 79 (16 Aug 2018 05:06) (73 - 83)  BP: 125/60 (16 Aug 2018 05:06) (104/56 - 125/60)  BP(mean): --  RR: 18 (16 Aug 2018 05:06) (18 - 18)  SpO2: 92% (16 Aug 2018 05:06) (92% - 95%)    PHYSICAL EXAM  General: adult in NAD  HEENT: clear oropharynx, anicteric sclera, pink conjunctiva  Neck: supple  CV: normal S1/S2 with no murmur rubs or gallops  Lungs: positive air movement b/l ant lungs,clear to auscultation, no wheezes, no rales  Abdomen: soft non-tender non-distended, no hepatosplenomegaly  Ext: no clubbing cyanosis or edema  Skin: no rashes and no petechiae  Neuro: alert and oriented X 4, no focal deficits      LABS:                          8.4    8.16  )-----------( 146      ( 15 Aug 2018 06:34 )             26.4         Mean Cell Volume : 109.1 fl  Mean Cell Hemoglobin : 34.7 pg  Mean Cell Hemoglobin Concentration : 31.8 gm/dL  Auto Neutrophil # : 4.67 K/uL  Auto Lymphocyte # : 1.46 K/uL  Auto Monocyte # : 0.98 K/uL  Auto Eosinophil # : 0.30 K/uL  Auto Basophil # : 0.02 K/uL  Auto Neutrophil % : 57.3 %  Auto Lymphocyte % : 17.9 %  Auto Monocyte % : 12.0 %  Auto Eosinophil % : 3.7 %  Auto Basophil % : 0.2 %      08-15    140  |  104  |  13  ----------------------------<  81  3.4<L>   |  26  |  0.78    Ca    8.1<L>      15 Aug 2018 06:34  Mg     1.8     08-15    TPro  6.1  /  Alb  2.5<L>  /  TBili  1.2  /  DBili  x   /  AST  24  /  ALT  28  /  AlkPhos  86  08-14          Iron - Total Binding Capacity.: 171 ug/dL (08-15 @ 06:34)  Ferritin, Serum: 429 ng/mL (08-15 @ 06:34)  Folate, Serum: >20.0 ng/mL (08-15 @ 06:34)  Vitamin B12, Serum: 944 pg/mL (08-15 @ 06:34)              RADIOLOGY & ADDITIONAL STUDIES:    < from: CT Head No Cont (08.14.18< from: CT Chest No Cont (08.14.18 @ 13:54) >  IMPRESSION:         Mild to moderate bilateral pleural effusions right greater than left with   bibasilar atelectasis right greater than left. Additional interlobular   septal thickening with patchy groundglass opacities suggesting mild   pulmonary edema.  Medically correlate for superimposed pneumonia.    < end of copied text >   @ 10:29) >  < from: CT Head No Cont (08.14.18 @ 10:29) >  MPRESSION:    1)  chronic ischemic changes with moderate volume loss. No acute   abnormality suggested..  2)  no intracerebral hemorrhage or contusion is identified.    < end of copied text >      < end of copied text > HEMATOLOGY ONCOLOGY CONSULT     Patient is a 89y old  Male who presents with a chief complaint of failure to thrive, AMS (14 Aug 2018 12:01)      HPI:  This is a 89-year-old gentleman with recently diagnosed locally advanced pancreatic adenocarcinoma diagnosed in May 2018 deemed not to be a surgical candidate after presenting with obstructive jaundice and had both biliary and pancreatic ductal stents placed, subsequently treated with gemcitabine and Abraxane for which he completed 4 months of therapy thus far who presents for generalized weakness.  The patient's last treatment was on August 10.  He notes that after his treatments he feels very lethargic and weak.  And this is not unusual after being treated.  The patient's primary oncologist is Dr. Trevino.  Of note the patient, prior to admission was found by his neighbor with hallucinations.    patient notes that after GEM ABRAXANE he has excessive fatigue  Treatment: Gemcitabine plus Abraxane  Current disease status: Stable    ROS: no complaints today   Negative except for:    PAST MEDICAL & SURGICAL HISTORY:  Paroxysmal A-fib  Pancreatic cancer  BPH (benign prostatic hyperplasia)  High cholesterol  CAD (coronary artery disease)  S/P TAVR (transcatheter aortic valve replacement)      SOCIAL HISTORY: Former smoker 30 p yr  Former ETOH no current us     FAMILY HISTORY:  No pertinent family history in first degree relatives      MEDICATIONS  (STANDING):  ascorbic acid 1000 milliGRAM(s) Oral daily  atorvastatin 20 milliGRAM(s) Oral at bedtime  clopidogrel Tablet 75 milliGRAM(s) Oral daily  enalapril 5 milliGRAM(s) Oral two times a day  finasteride 5 milliGRAM(s) Oral daily  folic acid 1 milliGRAM(s) Oral daily  heparin  Injectable 5000 Unit(s) SubCutaneous every 12 hours  metoprolol succinate ER 25 milliGRAM(s) Oral daily  pantoprazole    Tablet 40 milliGRAM(s) Oral two times a day  tamsulosin 0.4 milliGRAM(s) Oral at bedtime        Allergies    No Known Allergies    Intolerances        Vital Signs Last 24 Hrs  T(C): 36.8 (16 Aug 2018 05:06), Max: 36.9 (15 Aug 2018 22:29)  T(F): 98.2 (16 Aug 2018 05:06), Max: 98.4 (15 Aug 2018 22:29)  HR: 79 (16 Aug 2018 05:06) (73 - 83)  BP: 125/60 (16 Aug 2018 05:06) (104/56 - 125/60)  BP(mean): --  RR: 18 (16 Aug 2018 05:06) (18 - 18)  SpO2: 92% (16 Aug 2018 05:06) (92% - 95%)    PHYSICAL EXAM  General: adult in NAD  HEENT: clear oropharynx, anicteric sclera, pink conjunctiva  Neck: supple  CV: normal S1/S2 with no murmur rubs or gallops  Lungs: positive air movement b/l ant lungs,clear to auscultation, no wheezes, no rales  Abdomen: soft non-tender non-distended, no hepatosplenomegaly  Ext: no clubbing cyanosis or edema  Skin: no rashes and no petechiae  Neuro: alert and oriented X 4, no focal deficits      LABS:                          8.4    8.16  )-----------( 146      ( 15 Aug 2018 06:34 )             26.4         Mean Cell Volume : 109.1 fl  Mean Cell Hemoglobin : 34.7 pg  Mean Cell Hemoglobin Concentration : 31.8 gm/dL  Auto Neutrophil # : 4.67 K/uL  Auto Lymphocyte # : 1.46 K/uL  Auto Monocyte # : 0.98 K/uL  Auto Eosinophil # : 0.30 K/uL  Auto Basophil # : 0.02 K/uL  Auto Neutrophil % : 57.3 %  Auto Lymphocyte % : 17.9 %  Auto Monocyte % : 12.0 %  Auto Eosinophil % : 3.7 %  Auto Basophil % : 0.2 %      08-15    140  |  104  |  13  ----------------------------<  81  3.4<L>   |  26  |  0.78    Ca    8.1<L>      15 Aug 2018 06:34  Mg     1.8     08-15    TPro  6.1  /  Alb  2.5<L>  /  TBili  1.2  /  DBili  x   /  AST  24  /  ALT  28  /  AlkPhos  86  08-14          Iron - Total Binding Capacity.: 171 ug/dL (08-15 @ 06:34)  Ferritin, Serum: 429 ng/mL (08-15 @ 06:34)  Folate, Serum: >20.0 ng/mL (08-15 @ 06:34)  Vitamin B12, Serum: 944 pg/mL (08-15 @ 06:34)              RADIOLOGY & ADDITIONAL STUDIES:    < from: CT Head No Cont (08.14.18< from: CT Chest No Cont (08.14.18 @ 13:54) >  IMPRESSION:         Mild to moderate bilateral pleural effusions right greater than left with   bibasilar atelectasis right greater than left. Additional interlobular   septal thickening with patchy groundglass opacities suggesting mild   pulmonary edema.  Medically correlate for superimposed pneumonia.    < end of copied text >   @ 10:29) >  < from: CT Head No Cont (08.14.18 @ 10:29) >  MPRESSION:    1)  chronic ischemic changes with moderate volume loss. No acute   abnormality suggested..  2)  no intracerebral hemorrhage or contusion is identified.    < end of copied text >      < end of copied text >

## 2018-08-16 NOTE — DISCHARGE NOTE ADULT - PROVIDER TOKENS
TOKEN:'[id number]',FREE:[LAST:[Dr Trevino (Oncologist)],PHONE:[(   )    -],FAX:[(   )    -],ADDRESS:[[facility] [place], [place]]]

## 2018-08-16 NOTE — DISCHARGE NOTE ADULT - CARE PROVIDER_API CALL
Taras Tomas), Internal Medicine  [address] [place] [address]  Phone: [phone]  Fax: [phone]    Dr Trevino (Oncologist),   [facility] [place], [place]  Phone: (   )    -  Fax: (   )    -

## 2018-08-16 NOTE — DISCHARGE NOTE ADULT - MEDICATION SUMMARY - MEDICATIONS TO CHANGE
I will SWITCH the dose or number of times a day I take the medications listed below when I get home from the hospital:    enalapril 5 mg oral tablet  -- 1 tab(s) by mouth 2 times a day

## 2018-08-16 NOTE — PROGRESS NOTE ADULT - ASSESSMENT
89 yo male with recent diagnosis of pancreatic adenocarcinoma currently on chemo, s/p pancreatic and bile duct stents, CAD s/p stent, s/p TAVR, HTN, HLD, BPH, PAF, chronic anemia admitted with failure to thrive.      # Toxic encephalopathy secondary to an adverse reaction from chemo  - patient w/o signs of systemic infection, likely moreso dehydration and chemotherapy induced.   - given advanced age and overall poor functional status after each chemotherapy session would advise conservative measures  - discussed w/ Onc who discussed w/ patient's oncologist Dr. Trevino in [facility] --> wish to STOP chemo, transition to subacute Rehab for strenghtening and will re-evaluate upon dc from rehab whether to pursue radiation tx outpatient.   - CT head with no acute patholgy  - CT chest to r/o PNA - doubtful, no symptoms.   - PT eval  - SW for possible placement --> accepted to [facility].     #Failure to thrive  - nutritionist consult --> severe protein calorie malnutrition noted, encourage supplemental shakes.     #CAD s/p stent, s/p TAVR  - continue plavix, BB, statin    #Pancreatic adenocarcinoma  - outpatient onc follow up    #Chronic anemia  - check iron studies, B12, folate    #HTN/HLD  - continue home meds    #BPH- continue home meds    #DVT prophylaxis- heparin sq    Dispo: remain inpatient, discussed on IDR. Likely dc to [facility] tomorrow [facility]. Discussed w/ Dr. Tomas.  Total time > 40 mins. 89 yo male with recent diagnosis of pancreatic adenocarcinoma currently on chemo, s/p pancreatic and bile duct stents, CAD s/p stent, s/p TAVR, HTN, HLD, BPH, PAF, chronic anemia admitted with failure to thrive.      # Toxic encephalopathy secondary to an adverse reaction from chemo  - patient w/o signs of systemic infection, likely moreso dehydration and chemotherapy induced.   - given advanced age and overall poor functional status after each chemotherapy session would advise conservative measures  - discussed w/ Onc who discussed w/ patient's oncologist Dr. Trevino in [facility] --> wish to STOP chemo, transition to subacute Rehab for strenghtening and will re-evaluate upon dc from rehab whether to pursue radiation tx outpatient.   - CT head with no acute patholgy  - CT chest to r/o PNA - doubtful, no symptoms.   - PT eval  - SW for possible placement --> accepted to [facility].     #Failure to thrive  - nutritionist consult --> severe protein calorie malnutrition noted, encourage supplemental shakes.     #CAD s/p stent, s/p TAVR  - continue plavix, BB, statin    #Pancreatic adenocarcinoma  - outpatient onc follow up    #Chronic anemia - likely chemo induced with mild iron deficiency anemia.   - normal B12, folate    #HTN/HLD  - continue home meds    #BPH- continue home meds    #DVT prophylaxis- heparin sq    Dispo: remain inpatient, discussed on IDR. Likely dc to [facility] tomorrow [facility]. Discussed w/ Dr. Tomas.  Total time > 40 mins. 89 yo male with recent diagnosis of pancreatic adenocarcinoma currently on chemo, s/p pancreatic and bile duct stents, CAD s/p stent, s/p TAVR, HTN, HLD, BPH, PAF, chronic anemia admitted with failure to thrive.      # Toxic encephalopathy secondary to an adverse reaction from chemo  - patient w/o signs of systemic infection, likely moreso dehydration and chemotherapy induced.   - given advanced age and overall poor functional status after each chemotherapy session would advise conservative measures  - discussed w/ Onc who discussed w/ patient's oncologist Dr. Trevino in [facility] --> wish to STOP chemo, transition to subacute Rehab for strenghtening and will re-evaluate upon dc from rehab whether to pursue radiation tx outpatient.   - CT head with no acute patholgy  - CT chest to r/o PNA - doubtful, no symptoms.   - PT eval  - SW for possible placement --> accepted to [facility].     # Mild Hypoxic Respiratory Distress - 2/2 pleural effusions, will give IV lasix 20mg X1 today.   - repeat AM BMP    #Failure to thrive  - nutritionist consult --> severe protein calorie malnutrition noted, encourage supplemental shakes.     #CAD s/p stent, s/p TAVR  - continue plavix, BB, statin    #Pancreatic adenocarcinoma  - outpatient onc follow up    #Chronic anemia - likely chemo induced with mild iron deficiency anemia.   - normal B12, folate    #HTN/HLD  - continue home meds    #BPH- continue home meds    #DVT prophylaxis- heparin sq    Dispo: remain inpatient, discussed on IDR. Likely dc to [facility] tomorrow [facility]. Discussed w/ Dr. Tomas.  Total time > 40 mins. 89 yo male with recent diagnosis of pancreatic adenocarcinoma currently on chemo, s/p pancreatic and bile duct stents, CAD s/p stent, s/p TAVR, HTN, HLD, BPH, PAF, chronic anemia admitted with failure to thrive.      # Toxic encephalopathy secondary to an adverse reaction from chemo  - patient w/o signs of systemic infection, likely moreso dehydration and chemotherapy induced.   - given advanced age and overall poor functional status after each chemotherapy session would advise conservative measures  - discussed w/ Onc who discussed w/ patient's oncologist Dr. Trevino in [facility] --> wish to STOP chemo, transition to subacute Rehab for strengthening and will re-evaluate upon dc from rehab whether to pursue radiation tx outpatient.   - CT head with no acute patholgy  - CT chest to r/o PNA - doubtful, no symptoms.   - PT eval  - SW for possible placement --> accepted to [facility].     # Mild Hypoxic Respiratory Distress - 2/2 pleural effusions, will give IV lasix 20mg X1 today.   - repeat AM BMP    # HTN - patient w/ advanced age and low normal BP, would liberalize BP. Possibly contributing to weakness.  - lower Enalapril from 5mg to 2.5mg daily. If still low will DC altogether.     #Failure to thrive  - nutritionist consult --> severe protein calorie malnutrition noted, encourage supplemental shakes.     #CAD s/p stent, s/p TAVR  - continue plavix, BB, statin    #Pancreatic adenocarcinoma  - outpatient onc follow up    #Chronic anemia - likely chemo induced with mild iron deficiency anemia.   - normal B12, folate    #HTN/HLD  - continue home meds    #BPH- continue home meds    #DVT prophylaxis- heparin sq    Dispo: remain inpatient, discussed on IDR. Likely dc to [facility] tomorrow [facility]. Discussed w/ Dr. Tomas.  Total time > 40 mins.

## 2018-08-16 NOTE — DISCHARGE NOTE ADULT - PATIENT PORTAL LINK FT
You can access the [facility] Patient Portal, offered by [facility], by registering with the following website: [email or web address]

## 2018-08-16 NOTE — DISCHARGE NOTE ADULT - MEDICATION SUMMARY - MEDICATIONS TO TAKE
I will START or STAY ON the medications listed below when I get home from the hospital:    finasteride 5 mg oral tablet  -- 1 tab(s) by mouth once a day  -- Indication: For BPH    enalapril 5 mg oral tablet  -- 0.5 tab(s) by mouth once a day  -- Indication: For HTN    tamsulosin 0.4 mg oral capsule  -- 1 cap(s) by mouth once a day  -- Indication: For BPH    atorvastatin 20 mg oral tablet  -- 1 tab(s) by mouth once a day  -- Indication: For HLD    Plavix 75 mg oral tablet  -- 1 tab(s) by mouth once a day  -- Indication: For CAD    metoprolol succinate 25 mg oral tablet, extended release  -- 1 tab(s) by mouth once a day  -- Indication: For HTN    Protonix 40 mg oral delayed release tablet  -- 1 tab(s) by mouth 2 times a day   -- It is very important that you take or use this exactly as directed.  Do not skip doses or discontinue unless directed by your doctor.  Obtain medical advice before taking any non-prescription drugs as some may affect the action of this medication.  Swallow whole.  Do not crush.    -- Indication: For GERD    Vitamin C 1000 mg oral tablet  -- 1 tab(s) by mouth once a day  -- Indication: For Vit C    folic acid 1 mg oral tablet  --  by mouth   -- Indication: For Vitamin

## 2018-08-16 NOTE — CDI QUERY NOTE - NSCDIOTHERTXTBX_GEN_ALL_CORE_HH
Patient admitted with confusion, AMS and weakness  Hx of pancreatic cancer and received chemo last week.    Documentation reveals : AMS with hallucinations - r/o metabolic encephalopathy vs chemo related    When known please further clarify the etiology of the AMS.  a) Toxic encephalopathy secondary to an adverse reaction from chemo ?  b) Metabolic encephalopathy due to other etiology ? Please clarify the condition   c) No clinical evidence of encephalopathy ?  d) Unable to determine the underlying etiology of the AMS ?

## 2018-08-17 VITALS
DIASTOLIC BLOOD PRESSURE: 59 MMHG | HEART RATE: 78 BPM | SYSTOLIC BLOOD PRESSURE: 114 MMHG | OXYGEN SATURATION: 96 % | RESPIRATION RATE: 18 BRPM | TEMPERATURE: 98 F

## 2018-08-17 LAB
ANION GAP SERPL CALC-SCNC: 8 MMOL/L — SIGNIFICANT CHANGE UP (ref 5–17)
BUN SERPL-MCNC: 15 MG/DL — SIGNIFICANT CHANGE UP (ref 7–23)
CALCIUM SERPL-MCNC: 7.7 MG/DL — LOW (ref 8.5–10.1)
CHLORIDE SERPL-SCNC: 105 MMOL/L — SIGNIFICANT CHANGE UP (ref 96–108)
CO2 SERPL-SCNC: 28 MMOL/L — SIGNIFICANT CHANGE UP (ref 22–31)
CREAT SERPL-MCNC: 0.77 MG/DL — SIGNIFICANT CHANGE UP (ref 0.5–1.3)
GLUCOSE SERPL-MCNC: 138 MG/DL — HIGH (ref 70–99)
HCT VFR BLD CALC: 25.4 % — LOW (ref 39–50)
HGB BLD-MCNC: 8.1 G/DL — LOW (ref 13–17)
MCHC RBC-ENTMCNC: 31.9 GM/DL — LOW (ref 32–36)
MCHC RBC-ENTMCNC: 34.9 PG — HIGH (ref 27–34)
MCV RBC AUTO: 109.5 FL — HIGH (ref 80–100)
NRBC # BLD: 0 /100 WBCS — SIGNIFICANT CHANGE UP (ref 0–0)
PLATELET # BLD AUTO: 204 K/UL — SIGNIFICANT CHANGE UP (ref 150–400)
POTASSIUM SERPL-MCNC: 4.4 MMOL/L — SIGNIFICANT CHANGE UP (ref 3.5–5.3)
POTASSIUM SERPL-SCNC: 4.4 MMOL/L — SIGNIFICANT CHANGE UP (ref 3.5–5.3)
RBC # BLD: 2.32 M/UL — LOW (ref 4.2–5.8)
RBC # FLD: 20.4 % — HIGH (ref 10.3–14.5)
SODIUM SERPL-SCNC: 141 MMOL/L — SIGNIFICANT CHANGE UP (ref 135–145)
WBC # BLD: 8.08 K/UL — SIGNIFICANT CHANGE UP (ref 3.8–10.5)
WBC # FLD AUTO: 8.08 K/UL — SIGNIFICANT CHANGE UP (ref 3.8–10.5)

## 2018-08-17 RX ADMIN — Medication 1 MILLIGRAM(S): at 10:42

## 2018-08-17 RX ADMIN — Medication 25 MILLIGRAM(S): at 06:22

## 2018-08-17 RX ADMIN — Medication 2.5 MILLIGRAM(S): at 06:23

## 2018-08-17 RX ADMIN — Medication 1000 MILLIGRAM(S): at 10:42

## 2018-08-17 RX ADMIN — HEPARIN SODIUM 5000 UNIT(S): 5000 INJECTION INTRAVENOUS; SUBCUTANEOUS at 06:22

## 2018-08-17 RX ADMIN — PANTOPRAZOLE SODIUM 40 MILLIGRAM(S): 20 TABLET, DELAYED RELEASE ORAL at 06:22

## 2018-08-17 RX ADMIN — CLOPIDOGREL BISULFATE 75 MILLIGRAM(S): 75 TABLET, FILM COATED ORAL at 10:42

## 2018-08-17 RX ADMIN — FINASTERIDE 5 MILLIGRAM(S): 5 TABLET, FILM COATED ORAL at 10:42

## 2018-08-17 NOTE — PROGRESS NOTE ADULT - ASSESSMENT
89y old Male with hx of recent diagnosis of pancreatic adenocarcinoma (4/18) currently on chemo ([facility] in [place]), s/p pancreatic and bile duct stents, CAD s/p stent, s/p TAVR, HTN, HLD, BPH, PAF, chronic anemia admitted 8/14 for weakness, poor po intake and hallucinations possibly from chemo. Pt had last chemo 6 days ago. Typically does have weakness after chemo but it is worse in the past few days. He was found by his neighbor this morning crying and thinking he was in a shoe store. He does complain of chronic SOB since his TAVR. Family unable to take care of patient at home, lives by self. Found here to have negative CTH for acute pathology, CTC showing mild-mod bl pleural effusions R>L and mild PVCs. Palliative Care consulted to assist with establish GOC.     1) AMS/Hallucinations  - seem to be associated with chemo, and have resolved since being here  - workup thus far negative for any other pathology that could cause this  - CTH negative for acute issues, showing only chronic ischemic changes with some volume loss  - c/w monitoring  - fall precautions (pt noted he has fallen a few times in past few months)    2) Dyspnea  - stable  - hx of CAD and TAVR  - CTC showing effusions and PVCs  - c/w supplemental O2  - can consider low dose roxanol 2.5 mg q4-6h prn air hunger if above insufficient    3) Pancreatic Ca  - recently diagnosed, pt follows at [facility] for this  - he notes having 2 more rounds of chemo left and initial desire to continue with this  - onc notes here appreciated, no more chemo and plan to follow up outpt cc: rT options    4) Debility  - PPS 30-40%  - nutrition/dietary notes appreciated, severe protein calorie malnutrition noted  - muscle/fat loss, 11% weight loss in past 4 months, albumin 2.5  - PT consult says completed- plan for [facility]    5) Prognosis   - likely overall poor  - in setting of advanced age, pancreatic cancer with treatment leading to side effects, waning functional status also as complication of treatment, severe protein calorie malnutrition, and dyspnea, pt would fit criteria for hospice if in alignment with wishes     6) GOC/Advanced Directives  - pt has capacity, AMS has resolved  - HCP on chart naming children: 1) Daughter Cassia and 2) son Farhan [phone]  - full code, discussed and pt maintains this desire  - GOC meeting tentatively set for Saturday at 11am, however, seems that plan has come together since initial consult and pt is going to [facility] today, with plan to follow up with onc outpt cc: RT options.      *Given sx managed and GOC clear, will sign off*  Thank you for including us in Mr. Campo's care.     Francoise Mayo MD  Palliative Care Attending

## 2018-08-17 NOTE — PROGRESS NOTE ADULT - SUBJECTIVE AND OBJECTIVE BOX
CC: confusion    HPI: 89 yo male with recent diagnosis of pancreatic adenocarcinoma currently on chemo, s/p pancreatic and bile duct stents, CAD s/p stent, s/p TAVR, HTN, HLD, BPH, PAF, chronic anemia presents to ED with complaint of weakness. Pt had last chemo 6 days ago. Typically does have weakness after chemo but it is worse in the past few days. He has not been eating well at home. Has been having hallucination where he says he sees the "heriberto and queen" in his living room. He was found by his neighbor this morning crying and thinking he was in a shoe store. Hallucinations began after chemo. He does complain of chronic SOB since his TAVR. Does have a non productive cough. No fever. Family unable to take care of patient at home, lives by self. Ambulates with walker.     Subj: Seen earlier this AM, denies HA / fevers/ chills. States "hallucinations" are common side effect he experiences after chemo and immediately after each session in the past he goes home "wiped out, no energy".    ROS: + generalized weakness, all other ROS is neg unless stated above.     Vital Signs Last 24 Hrs  T(C): 36.6 (15 Aug 2018 14:44), Max: 36.6 (14 Aug 2018 17:09)  T(F): 97.9 (15 Aug 2018 14:44), Max: 97.9 (14 Aug 2018 17:09)  HR: 74 (15 Aug 2018 14:44) (63 - 74)  BP: 109/83 (15 Aug 2018 14:44) (109/83 - 127/56)  BP(mean): --  RR: 18 (15 Aug 2018 14:44) (18 - 18)  SpO2: 95% (15 Aug 2018 14:44) (93% - 96%)    PHYSICAL EXAM:  Constitutional: NAD, awake and alert, well-developed elderly male appears comfortable.   HEENT: PERR, EOMI, Normal Hearing, MMM  Neck: Soft and supple, No LAD, No JVD  Respiratory: Breath sounds are clear bilaterally, No wheezing, rales or rhonchi  Cardiovascular: S1 and S2, regular rate and rhythm, no Murmurs, gallops or rubs  Gastrointestinal: Bowel Sounds present, soft, nontender, nondistended, no guarding, no rebound  Extremities: No peripheral edema  Vascular: 2+ peripheral pulses  Neurological: A/O x 3, no focal deficits  Musculoskeletal: 5/5 strength b/l upper and lower extremities  Skin: No rashes    MEDICATIONS  (STANDING):  ascorbic acid 1000 milliGRAM(s) Oral daily  atorvastatin 20 milliGRAM(s) Oral at bedtime  clopidogrel Tablet 75 milliGRAM(s) Oral daily  enalapril 5 milliGRAM(s) Oral two times a day  finasteride 5 milliGRAM(s) Oral daily  folic acid 1 milliGRAM(s) Oral daily  heparin  Injectable 5000 Unit(s) SubCutaneous every 12 hours  metoprolol succinate ER 25 milliGRAM(s) Oral daily  pantoprazole    Tablet 40 milliGRAM(s) Oral two times a day  tamsulosin 0.4 milliGRAM(s) Oral at bedtime    LABS: All Labs Reviewed:                        8.4    8.16  )-----------( 146      ( 15 Aug 2018 06:34 )             26.4     08-15    140  |  104  |  13  ----------------------------<  81  3.4<L>   |  26  |  0.78    Ca    8.1<L>      15 Aug 2018 06:34  Mg     1.8     08-15    TPro  6.1  /  Alb  2.5<L>  /  TBili  1.2  /  DBili  x   /  AST  24  /  ALT  28  /  AlkPhos  86  08-14    CARDIAC MARKERS ( 14 Aug 2018 09:14 )  0.039 ng/mL / x     / 59 U/L / x     / x         CT Chest No Cont (08.14.18 @ 13:54)   Mild to moderate bilateral pleural effusions right greater than left with   bibasilar atelectasis right greater than left. Additional interlobular   septal thickening with patchy groundglass opacities suggesting mild   pulmonary edema.  Medically correlate for superimposed pneumonia.
CC: confusion    HPI: This is a 89 yo male with recent diagnosis of pancreatic adenocarcinoma currently on chemo, s/p pancreatic and bile duct stents, CAD s/p stent, s/p TAVR, HTN, HLD, BPH, PAF, chronic anemia presents to ED with complaint of weakness. Pt had last chemo 6 days ago. Typically does have weakness after chemo but it is worse in the past few days. He has not been eating well at home. Has been having hallucination where he says he sees the "heriberto and queen" in his living room. He was found by his neighbor this morning crying and thinking he was in a shoe store. Hallucinations began after chemo. He does complain of chronic SOB since his TAVR. Does have a non productive cough. No fever. Family unable to take care of patient at home, lives by self. Ambulates with walker.     8/15 Subj: Seen earlier this AM, denies HA / fevers/ chills. States "hallucinations" are common side effect he experiences after chemo and immediately after each session in the past he goes home "wiped out, no energy".    8/16: Still a bit weak, no pain, no CP, endorses chronic dyspnea worsened with exertion.     ROS: + generalized weakness, all other ROS is neg unless stated above.     Vital Signs Last 24 Hrs  T(C): 36.9 (16 Aug 2018 10:53), Max: 36.9 (15 Aug 2018 22:29)  T(F): 98.4 (16 Aug 2018 10:53), Max: 98.4 (15 Aug 2018 22:29)  HR: 73 (16 Aug 2018 10:53) (73 - 83)  BP: 105/48 (16 Aug 2018 10:53) (104/56 - 125/60)  BP(mean): --  RR: 18 (16 Aug 2018 10:53) (18 - 18)  SpO2: 93% (16 Aug 2018 10:53) (92% - 94%)    PHYSICAL EXAM:  Constitutional: NAD, awake and alert, well-developed elderly male appears comfortable.   HEENT: PERR, EOMI, Normal Hearing, MMM  Neck: Soft and supple, No LAD, No JVD  Respiratory: Breath sounds are audible bilaterally, mildly decreased on bases, no wheezing.   Cardiovascular: S1 and S2, regular rate and rhythm, no Murmurs, gallops or rubs  Gastrointestinal: Bowel Sounds present, soft, nontender, nondistended, no guarding, no rebound  Extremities: No peripheral edema  Vascular: 2+ peripheral pulses  Neurological: A/O x 3, no focal deficits  Musculoskeletal: 5/5 strength b/l upper and lower extremities  Skin: No rashes                          9.0    9.15  )-----------( 197      ( 16 Aug 2018 09:50 )             28.0   08-16    136  |  103  |  16  ----------------------------<  125<H>  3.9   |  25  |  0.84    Ca    8.1<L>      16 Aug 2018 09:50  Mg     1.8     08-15    CT Chest No Cont (08.14.18 @ 13:54) >  Mild to moderate bilateral pleural effusions right greater than left with   bibasilar atelectasis right greater than left. Additional interlobular   septal thickening with patchy groundglass opacities suggesting mild   pulmonary edema.  Medically correlate for superimposed pneumonia.
HEMATOLOGY ONCOLOGY PROGRESS NOTE    Patient is a 89y old  Male who presents with a chief complaint of failure to thrive, AMS (14 Aug 2018 12:01)      S: No new complaints. Patient reports feeling better. no overnight events.     ROS: no complaints today   All remaining systems were reveiwed and were negative      MEDICATIONS  (STANDING):  ascorbic acid 1000 milliGRAM(s) Oral daily  atorvastatin 20 milliGRAM(s) Oral at bedtime  clopidogrel Tablet 75 milliGRAM(s) Oral daily  enalapril 5 milliGRAM(s) Oral two times a day  finasteride 5 milliGRAM(s) Oral daily  folic acid 1 milliGRAM(s) Oral daily  heparin  Injectable 5000 Unit(s) SubCutaneous every 12 hours  metoprolol succinate ER 25 milliGRAM(s) Oral daily  pantoprazole    Tablet 40 milliGRAM(s) Oral two times a day  tamsulosin 0.4 milliGRAM(s) Oral at bedtime        Vital Signs Last 24 Hrs  T(C): 36.9 (17 Aug 2018 05:18), Max: 36.9 (16 Aug 2018 10:53)  T(F): 98.5 (17 Aug 2018 05:18), Max: 98.5 (17 Aug 2018 05:18)  HR: 78 (17 Aug 2018 05:18) (73 - 82)  BP: 114/59 (17 Aug 2018 05:18) (101/50 - 114/59)  BP(mean): --  RR: 18 (17 Aug 2018 05:18) (17 - 18)  SpO2: 96% (17 Aug 2018 05:18) (93% - 96%)    PHYSICAL EXAM  General: adult in NAD  HEENT: clear oropharynx, anicteric sclera, pink conjunctiva  Neck: supple  CV: normal S1/S2 with no murmur rubs or gallops  Lungs: positive air movement b/l ant lungs,clear to auscultation, no wheezes, no rales  Abdomen: soft non-tender non-distended, no hepatosplenomegaly  Ext: no clubbing cyanosis or edema  Skin: no rashes and no petechiae  Neuro: alert and oriented X 4, no focal deficits      .  LABS:                         8.1    8.08  )-----------( 204      ( 17 Aug 2018 05:49 )             25.4     08-17    141  |  105  |  15  ----------------------------<  138<H>  4.4   |  28  |  0.77    Ca    7.7<L>      17 Aug 2018 05:49              RADIOLOGY & ADDITIONAL STUDIES:    < from: CT Head No Cont (08.14.18< from: CT Chest No Cont (08.14.18 @ 13:54) >  IMPRESSION:         Mild to moderate bilateral pleural effusions right greater than left with   bibasilar atelectasis right greater than left. Additional interlobular   septal thickening with patchy groundglass opacities suggesting mild   pulmonary edema.  Medically correlate for superimposed pneumonia.    < end of copied text >   @ 10:29) >  < from: CT Head No Cont (08.14.18 @ 10:29) >  MPRESSION:    1)  chronic ischemic changes with moderate volume loss. No acute   abnormality suggested..  2)  no intracerebral hemorrhage or contusion is identified.    < end of copied text >      < end of copied text >
HPI: Pt seen and examined this am in follow up for sx and gOC, no family at bedside. Pt feeling well denies any complaints. He notes meeting with Dr. Tomas just prior and agreeing with him that he has "had my fill" of chemo and that this will be stopped. He says plan is now to go to rehab to get a little stronger and then follow up with his outpt oncologist cc: consideration of RT. He notes he and his children are now all on the same page and he is grateful for their support.       PAIN: denies  DYSPNEA: denies      ROS:  All other systems reviewed and negative      PHYSICAL EXAM:    Vital Signs Last 24 Hrs  T(C): 36.9 (17 Aug 2018 05:18), Max: 36.9 (16 Aug 2018 10:53)  T(F): 98.5 (17 Aug 2018 05:18), Max: 98.5 (17 Aug 2018 05:18)  HR: 78 (17 Aug 2018 05:18) (73 - 82)  BP: 114/59 (17 Aug 2018 05:18) (101/50 - 114/59)  BP(mean): --  RR: 18 (17 Aug 2018 05:18) (17 - 18)  SpO2: 96% (17 Aug 2018 05:18) (93% - 96%)  Daily     Daily     PPSV2: 30-40  %    General: Elderly male sitting up in chair, pleasant, friendly, NAD  Mental Status: AOx4  HEENT: mmm, perrl, nc in place, some temporal and clavicular muscle wasting  Lungs: dec at bases bl  Cardiac: +s1 s2 rrr  GI: soft nt nd +bs  : incontinent intermittently  Ext: no edema, moves all extremities  Neuro: no focal deficits    LABS:                        8.1    8.08  )-----------( 204      ( 17 Aug 2018 05:49 )             25.4     08-17    141  |  105  |  15  ----------------------------<  138<H>  4.4   |  28  |  0.77    Ca    7.7<L>      17 Aug 2018 05:49        Albumin: Albumin, Serum: 2.5 g/dL (08-14 @ 09:14)      Allergies    No Known Allergies    Intolerances      MEDICATIONS  (STANDING):  ascorbic acid 1000 milliGRAM(s) Oral daily  atorvastatin 20 milliGRAM(s) Oral at bedtime  clopidogrel Tablet 75 milliGRAM(s) Oral daily  enalapril 2.5 milliGRAM(s) Oral daily  finasteride 5 milliGRAM(s) Oral daily  folic acid 1 milliGRAM(s) Oral daily  heparin  Injectable 5000 Unit(s) SubCutaneous every 12 hours  metoprolol succinate ER 25 milliGRAM(s) Oral daily  pantoprazole    Tablet 40 milliGRAM(s) Oral two times a day  tamsulosin 0.4 milliGRAM(s) Oral at bedtime    MEDICATIONS  (PRN):      RADIOLOGY:

## 2018-08-17 NOTE — PROGRESS NOTE ADULT - ASSESSMENT
This is a 89-year-old gentleman with a history of locally advanced pancreatic adenocarcinoma presently on active treatment with gemcitabine and Abraxane admitted for weakness.    Plan:   At this time after discussion with both the patient and primary oncologist will forgoe plans for additional systemic chemotherapy.   Plan is for the patient to undergo CT imaging as an outpatient at [facility]   if patient has demonstrated response to treatment then will plan for RT   Patient will go to Rehab from this hospitalization        If you have any questions contact me at [phone]

## 2018-08-17 NOTE — CHART NOTE - NSCHARTNOTEFT_GEN_A_CORE
Palliative Medicine Discharge Summary    **This is a summary of the conversations and decisions you have made with the support of the Palliative Care Team**    Goals Of Care Conversation Held: 8/15/2018  Advance Directives:  Health Care Proxy: Edil  Code Status: Trial of Resuscitation and Intubation    Goals of Care/Preferences for Treatment:  We met to talk about your diagnosis, how this will effect your life, and what is important to you. You shared your understanding of your pancreatic cancer diagnosis and how you have faired thus far with chemotherapy treatment. You initially expressed desire to continue with this, not ready to set any limits on your care plan. We called your son Farhan to arrange a meeting to discuss this plan and provide any further support. However, in the interim, your subspecialists and primary team have talked with your primary oncologist and your family, deciding that further chemo would not be in your best interest. They discussed this with you as well and you agreed. You also agreed with plan to go to rehab to get stronger and follow up with your oncologist upon discharge from rehab about further treatment options. You were being discharged before our planned meeting with your family, but it seems that your plan is clear and so are your goals. Though we did not get the chance to meet your family this time around, if ever your return to the hospital and need more support in making a plan that best suits your needs, we will be available to you. We continue to hope with you and your family for the best.     Discharge Plan:    Plan is to go to rehab. You can expect that you will continue to have the best care and respect of your right to dignity and to be without suffering.     It has been a pleasure to get to know you and your family. We wish you all the best.    Francoise Mayo MD  Palliative Care Attending

## 2018-08-23 DIAGNOSIS — D64.81 ANEMIA DUE TO ANTINEOPLASTIC CHEMOTHERAPY: ICD-10-CM

## 2018-08-23 DIAGNOSIS — I25.10 ATHEROSCLEROTIC HEART DISEASE OF NATIVE CORONARY ARTERY WITHOUT ANGINA PECTORIS: ICD-10-CM

## 2018-08-23 DIAGNOSIS — E43 UNSPECIFIED SEVERE PROTEIN-CALORIE MALNUTRITION: ICD-10-CM

## 2018-08-23 DIAGNOSIS — C25.9 MALIGNANT NEOPLASM OF PANCREAS, UNSPECIFIED: ICD-10-CM

## 2018-08-23 DIAGNOSIS — N40.0 BENIGN PROSTATIC HYPERPLASIA WITHOUT LOWER URINARY TRACT SYMPTOMS: ICD-10-CM

## 2018-08-23 DIAGNOSIS — D50.9 IRON DEFICIENCY ANEMIA, UNSPECIFIED: ICD-10-CM

## 2018-08-23 DIAGNOSIS — R06.03 ACUTE RESPIRATORY DISTRESS: ICD-10-CM

## 2018-08-23 DIAGNOSIS — E78.5 HYPERLIPIDEMIA, UNSPECIFIED: ICD-10-CM

## 2018-08-23 DIAGNOSIS — Z51.5 ENCOUNTER FOR PALLIATIVE CARE: ICD-10-CM

## 2018-08-23 DIAGNOSIS — E86.0 DEHYDRATION: ICD-10-CM

## 2018-08-23 DIAGNOSIS — G92 TOXIC ENCEPHALOPATHY: ICD-10-CM

## 2018-08-23 DIAGNOSIS — T45.1X5A ADVERSE EFFECT OF ANTINEOPLASTIC AND IMMUNOSUPPRESSIVE DRUGS, INITIAL ENCOUNTER: ICD-10-CM

## 2018-08-23 DIAGNOSIS — R62.7 ADULT FAILURE TO THRIVE: ICD-10-CM

## 2018-08-23 DIAGNOSIS — R09.02 HYPOXEMIA: ICD-10-CM

## 2018-08-23 DIAGNOSIS — I48.0 PAROXYSMAL ATRIAL FIBRILLATION: ICD-10-CM

## 2018-08-23 DIAGNOSIS — Z95.5 PRESENCE OF CORONARY ANGIOPLASTY IMPLANT AND GRAFT: ICD-10-CM

## 2018-08-23 DIAGNOSIS — I10 ESSENTIAL (PRIMARY) HYPERTENSION: ICD-10-CM

## 2018-08-31 NOTE — ED PROVIDER NOTE - CPE EDP SKIN NORM
DISPLAY PLAN FREE TEXT DISPLAY PLAN FREE TEXT DISPLAY PLAN FREE TEXT DISPLAY PLAN FREE TEXT DISPLAY PLAN FREE TEXT DISPLAY PLAN FREE TEXT normal...

## 2018-10-25 NOTE — ED ADULT NURSE NOTE - NSFALLRSKHARMRISK_ED_ALL_ED
yes

## 2018-12-14 ENCOUNTER — EMERGENCY (EMERGENCY)
Facility: HOSPITAL | Age: 83
LOS: 0 days | Discharge: HOME | End: 2018-12-15
Attending: EMERGENCY MEDICINE | Admitting: EMERGENCY MEDICINE
Payer: MEDICARE

## 2018-12-14 VITALS
RESPIRATION RATE: 18 BRPM | HEART RATE: 87 BPM | HEIGHT: 73 IN | WEIGHT: 184.97 LBS | OXYGEN SATURATION: 99 % | SYSTOLIC BLOOD PRESSURE: 167 MMHG | DIASTOLIC BLOOD PRESSURE: 81 MMHG | TEMPERATURE: 98 F

## 2018-12-14 DIAGNOSIS — Z79.02 LONG TERM (CURRENT) USE OF ANTITHROMBOTICS/ANTIPLATELETS: ICD-10-CM
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
## 2018-12-14 DIAGNOSIS — I25.10 ATHEROSCLEROTIC HEART DISEASE OF NATIVE CORONARY ARTERY WITHOUT ANGINA PECTORIS: ICD-10-CM

## 2018-12-14 DIAGNOSIS — I48.0 PAROXYSMAL ATRIAL FIBRILLATION: ICD-10-CM

## 2018-12-14 DIAGNOSIS — Z95.2 PRESENCE OF PROSTHETIC HEART VALVE: Chronic | ICD-10-CM

## 2018-12-14 DIAGNOSIS — E78.5 HYPERLIPIDEMIA, UNSPECIFIED: ICD-10-CM

## 2018-12-14 DIAGNOSIS — N40.0 BENIGN PROSTATIC HYPERPLASIA WITHOUT LOWER URINARY TRACT SYMPTOMS: ICD-10-CM

## 2018-12-14 DIAGNOSIS — S01.01XA LACERATION WITHOUT FOREIGN BODY OF SCALP, INITIAL ENCOUNTER: ICD-10-CM
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
## 2018-12-14 PROCEDURE — 12002 RPR S/N/AX/GEN/TRNK2.6-7.5CM: CPT | Mod: GV

## 2018-12-14 PROCEDURE — 99284 EMERGENCY DEPT VISIT MOD MDM: CPT | Mod: 25

## 2018-12-14 RX ORDER — TETANUS TOXOID, REDUCED DIPHTHERIA TOXOID AND ACELLULAR PERTUSSIS VACCINE, ADSORBED 5; 2.5; 8; 8; 2.5 [IU]/.5ML; [IU]/.5ML; UG/.5ML; UG/.5ML; UG/.5ML
0.5 SUSPENSION INTRAMUSCULAR ONCE
Qty: 0 | Refills: 0 | Status: COMPLETED | OUTPATIENT
Start: 2018-12-14 | End: 2018-12-14

## 2018-12-14 NOTE — ED ADULT TRIAGE NOTE - CHIEF COMPLAINT QUOTE
Patient presents via EMS from NH for a displaced suprapubic cath. Fall from standing height on Plavix with posterior scalp laceration.

## 2018-12-15 PROBLEM — I48.0 PAROXYSMAL ATRIAL FIBRILLATION: Chronic | Status: ACTIVE | Noted: 2018-08-14

## 2018-12-15 PROBLEM — C25.9 MALIGNANT NEOPLASM OF PANCREAS, UNSPECIFIED: Chronic | Status: ACTIVE | Noted: 2018-08-14

## 2018-12-15 PROCEDURE — 70450 CT HEAD/BRAIN W/O DYE: CPT | Mod: 26

## 2018-12-15 PROCEDURE — 72125 CT NECK SPINE W/O DYE: CPT | Mod: 26

## 2018-12-15 RX ADMIN — TETANUS TOXOID, REDUCED DIPHTHERIA TOXOID AND ACELLULAR PERTUSSIS VACCINE, ADSORBED 0.5 MILLILITER(S): 5; 2.5; 8; 8; 2.5 SUSPENSION INTRAMUSCULAR at 00:40

## 2018-12-15 NOTE — ED PROVIDER NOTE - OBJECTIVE STATEMENT
pt presents after mechanical fall in front of aide with genralized achy headache on plavix. no loc. denies fever. denies neck pain. no chest pain or sob. no abd pain. no n/v/d. no urinary f/u/d. no back pain. no motor or sensory deficits.  no other acute issues symptoms or concerns

## 2018-12-15 NOTE — ED ADULT NURSE NOTE - CHPI ED NUR SYMPTOMS NEG
no chills/no decreased eating/drinking/no dizziness/no fever/no weakness/no nausea/no pain/no vomiting/no tingling

## 2018-12-15 NOTE — ED ADULT NURSE NOTE - PMH
BPH (benign prostatic hyperplasia)    CAD (coronary artery disease)    High cholesterol    Pancreatic cancer    Paroxysmal A-fib

## 2018-12-15 NOTE — ED ADULT NURSE NOTE - NSIMPLEMENTINTERV_GEN_ALL_ED
Implemented All Universal Safety Interventions:  [place] to call system. Call bell, personal items and telephone within reach. Instruct patient to call for assistance. Room bathroom lighting operational. Non-slip footwear when patient is off stretcher. Physically safe environment: no spills, clutter or unnecessary equipment. Stretcher in lowest position, wheels locked, appropriate side rails in place.

## 2020-02-06 NOTE — ED PROVIDER NOTE - EYES, MLM
DISCHARGE SUMMARY  PATIENT:  DARREL GUNDERSON  YOB: 1939  VISIT ID:  [id number]  PRIMARY CARE:  Norman Gomez DO  ADMITTING PHYSICIAN:  Petra att. providers found    DATE OF ADMISSION:  2/5/2020  DATE OF DISCHARGE:  02/06/20      ADMITTING DIAGNOSIS:  Cervical myelopathy   C2-3 stenosis    DISCHARGE DIAGNOSIS:  Cervical myelopathy    Past Medical History:   Diagnosis Date   • Cancer (CMS/[facility]) 2001    Prostate ( seeding)    • Coronary artery disease    • Glaucoma    • Heart disease    • Low back pain    • Skin cancer     nonmelaonma    • Spinal stenosis     cervical and lumbar          PROCEDURES:  CERVICAL LAMINECTOMY DECOMPRESSION POSTERIOR C3    BRIEF HOSPITAL COURSE:  Mr. Gunderson is a 80 y.o. male who initially presented to our office with complaints of back pain and bilateral lower extremity pain.  On exam, the patient was noted to have hyperreflexia and an MRI of the cervical spine was ordered.  This demonstrated severe stenosis at C3-4 with myelomalacia at C3.  The patient was eager to avoid surgery and at that time did not have any gait disturbance, weakness or bowel bladder dysfunction.  He presented back in follow-up several months later with continued complaints of lumbar pain.  Given his preoperative imaging findings in his cervical spine, it was recommended that the patient pursue cervical decompression prior to addressing his back pain.  As such, the patient underwent an uncomplicated C3, partial C2 laminectomy for cervical myelopathy on 2/5/2019.    Over the course of his hospital stay, vitals remained stable and his post-op dressing was clean, dry and intact.  The patient was placed in a cervical collar postoperatively. Motor and sensory function were found to be intact throughout with the exception of his baseline upper extremity numbness that was unchanged.  He was ambulatory, voiding independently, and tolerated PO without associated nausea or vomiting. Pain was minimal  and well controlled with oral medications at the time of discharge on 2/6/2019.      DISCHARGE MEDICATIONS:     Discharge Medications      New Medications      Instructions Start Date   bisacodyl 5 MG EC tablet  Commonly known as:  DULCOLAX   5 mg, Oral, Daily PRN      methocarbamol 750 MG tablet  Commonly known as:  ROBAXIN   750 mg, Oral, 3 Times Daily PRN      oxyCODONE-acetaminophen 5-325 MG per tablet  Commonly known as:  PERCOCET   1 tablet, Oral, Every 4 Hours PRN         Continue These Medications      Instructions Start Date   aspirin 81 MG EC tablet   81 mg, Oral, Daily      atorvastatin 20 MG tablet  Commonly known as:  LIPITOR   20 mg, Oral, Daily      bimatoprost 0.01 % ophthalmic drops  Commonly known as:  LUMIGAN   1 drop, Both Eyes, Nightly      felodipine 10 MG 24 hr tablet  Commonly known as:  PLENDIL   10 mg, Oral, Daily      multivitamin with minerals tablet tablet   1 tablet, Oral, Daily      olopatadine 0.6 % solution nasal solution  Commonly known as:  PATANASE   2 sprays, Each Nare, Daily      tamsulosin 0.4 MG capsule 24 hr capsule  Commonly known as:  FLOMAX   1 capsule, Oral, Nightly           Discharge to home    ACTIVITY:  As tolerated  No strenuous activity or heavy lifting  Avoid repetitive bending and twisting    DIET:  As tolerated    FOLLOW UP:  3 weeks with neurosurgery PA    Follow-up Information     Jake Camarena PA-C Follow up in 3 week(s).    Specialty:  Neurosurgery  Contact information:  [address] [facility] [address]  [facility] [address] [phone]             Norman Gomez, DO .    Specialty:  Internal Medicine  Contact information:  [address]  [facility] [address] [phone]                              Clear bilaterally, pupils equal, round and reactive to light.

## 2021-03-12 NOTE — DISCHARGE NOTE ADULT - MEDICATION SUMMARY - MEDICATIONS TO CHANGE
Try to control blood sugars and follow up with your primary doctor on a regular basis.  Try to keep the A1C below 7.0.    Follow up in one year.    I will SWITCH the dose or number of times a day I take the medications listed below when I get home from the hospital:  None

## 2021-08-30 NOTE — ED ADULT TRIAGE NOTE - NS ED NURSE AMBULANCES
Patient left message on 8/28/21, requesting a refill for Norco, please advise.   [facility]

## 2022-05-03 NOTE — ED ADULT NURSE NOTE - NS ED NURSE DISCH DISPOSITION
PAST SURGICAL HISTORY:  No significant past surgical history     No significant past surgical history     
Discharged

## 2022-08-16 NOTE — ED PROVIDER NOTE - PROGRESS NOTE DETAILS
XR with [facility]D -- will d/c on short course nsaids, to f/u PMD +/- ortho.  Son coming to pick him up. Purse String (Simple) Text: Given the location of the defect and the characteristics of the surrounding skin a purse string closure was deemed most appropriate.  Undermining was performed circumfirentially around the surgical defect.  A purse string suture was then placed and tightened.

## 2023-02-21 NOTE — ED STATDOCS - PLAN OF CARE
never During your ED visit you were evaluated for right knee pain. You had xrays of your knee completed. ou were placed in a right knee immobilizer. Ambulate with crutches. Take tylenol as needed for pain. Follow up with Orthopedics Dr. Keene within 1 week. [phone]. Follow up with your pcp within 1 week. Return to the ED if you exhibit any new, continued or worsening symptoms.

## 2023-03-08 NOTE — ED ADULT NURSE NOTE - IS THE PATIENT ABLE TO BE SCREENED?
Yes Libtayo Counseling- I discussed with the patient the risks of Libtayo including but not limited to nausea, vomiting, diarrhea, and bone or muscle pain.  The patient verbalized understanding of the proper use and possible adverse effects of Libtayo.  All of the patient's questions and concerns were addressed.

## 2024-01-01 NOTE — BRIEF OPERATIVE NOTE - POST-OP DX
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

## 2024-01-31 NOTE — ED ADULT NURSE NOTE - NSSISCREENINGQ5_ED_A_ED
Reviewed preliminary reports from [facility]. Xray shows B hip arthritis. They did prescribe Tramadol. Patient scheduled to see me for follow up tomorrow.    No

## 2024-11-08 NOTE — ED PROVIDER NOTE - CONTEXT
"Patient has Combined Systolic and Diastolic heart failure that is Chronic. On presentation their CHF was well compensated. Most recent BNP and echo results are listed below.  No results for input(s): "BNP" in the last 72 hours.    Latest ECHO  Results for orders placed during the hospital encounter of 05/07/24    Echo    Interpretation Summary    Left Ventricle: The left ventricle is mildly dilated. Ventricular mass is normal. Normal wall thickness. Severe global hypokinesis present. There is severely reduced systolic function. Ejection fraction by visual approximation is 15%. There is diastolic dysfunction.    Right Ventricle: Severe right ventricular enlargement. Wall thickness is normal. Right ventricle wall motion has global hypokinesis. Systolic function is mildly reduced.    Left Atrium: Left atrium is severely dilated. 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    Right Atrium: Right atrium is dilated.    Aortic Valve: There is mild aortic regurgitation.    Mitral Valve: There is mild regurgitation.    Tricuspid Valve: There is annular dilation present. There is normal leaflet mobility. There is severe functional regurgitation.    Pulmonary Artery: The estimated pulmonary artery systolic pressure is at least 24 mmHg. PASP is likely under-estimated in the setting of severe tricuspid regurgitation.    IVC/SVC: Central venous pressure of at least 8 mmHg.    No vegetation seen.    Current Heart Failure Medications       Plan  - Monitor strict I&Os and daily weights.    - Place on telemetry  - Low sodium diet  - Place on fluid restriction of 1 L.   - Cardiology has not been consulted  - The patient's volume status is stable but not at their baseline as indicated by edema- UF with HD as tolerated. Lasix DC'd 10/30 due to low BP      " unknown

## 2025-03-27 NOTE — PATIENT PROFILE ADULT. - AS SC [facility] M[employer]ILITY
You may give your child acetaminophen as needed for pain.  If your child develops repeated episodes of vomiting, sudden weakness, severe headache not helped by the acetaminophen and/or any other worrisome symptoms within the next 24 hours please return to the nearest emergency department immediately.   (3) slightly limited